# Patient Record
Sex: MALE | Race: WHITE | NOT HISPANIC OR LATINO | URBAN - METROPOLITAN AREA
[De-identification: names, ages, dates, MRNs, and addresses within clinical notes are randomized per-mention and may not be internally consistent; named-entity substitution may affect disease eponyms.]

---

## 2017-05-24 ENCOUNTER — INPATIENT (INPATIENT)
Facility: HOSPITAL | Age: 82
LOS: 4 days | Discharge: HOME | DRG: 312 | End: 2017-05-29
Attending: INTERNAL MEDICINE | Admitting: INTERNAL MEDICINE
Payer: MEDICARE

## 2017-05-24 VITALS
DIASTOLIC BLOOD PRESSURE: 83 MMHG | HEART RATE: 86 BPM | SYSTOLIC BLOOD PRESSURE: 150 MMHG | OXYGEN SATURATION: 100 % | RESPIRATION RATE: 16 BRPM

## 2017-05-24 DIAGNOSIS — R55 SYNCOPE AND COLLAPSE: ICD-10-CM

## 2017-05-24 LAB
ALBUMIN SERPL ELPH-MCNC: 4.5 G/DL — SIGNIFICANT CHANGE UP (ref 3.3–5)
ALP SERPL-CCNC: 61 U/L — SIGNIFICANT CHANGE UP (ref 40–120)
ALT FLD-CCNC: 13 U/L RC — SIGNIFICANT CHANGE UP (ref 10–45)
ANION GAP SERPL CALC-SCNC: 15 MMOL/L — SIGNIFICANT CHANGE UP (ref 5–17)
APTT BLD: 31 SEC — SIGNIFICANT CHANGE UP (ref 27.5–37.4)
AST SERPL-CCNC: 19 U/L — SIGNIFICANT CHANGE UP (ref 10–40)
BASOPHILS # BLD AUTO: 0 K/UL — SIGNIFICANT CHANGE UP (ref 0–0.2)
BASOPHILS NFR BLD AUTO: 0.5 % — SIGNIFICANT CHANGE UP (ref 0–2)
BILIRUB SERPL-MCNC: 0.6 MG/DL — SIGNIFICANT CHANGE UP (ref 0.2–1.2)
BUN SERPL-MCNC: 28 MG/DL — HIGH (ref 7–23)
CALCIUM SERPL-MCNC: 10.5 MG/DL — SIGNIFICANT CHANGE UP (ref 8.4–10.5)
CHLORIDE SERPL-SCNC: 102 MMOL/L — SIGNIFICANT CHANGE UP (ref 96–108)
CK MB BLD-MCNC: 2.4 % — SIGNIFICANT CHANGE UP (ref 0–3.5)
CK MB CFR SERPL CALC: 3.1 NG/ML — SIGNIFICANT CHANGE UP (ref 0–6.7)
CK MB CFR SERPL CALC: 3.1 NG/ML — SIGNIFICANT CHANGE UP (ref 0–6.7)
CK SERPL-CCNC: 128 U/L — SIGNIFICANT CHANGE UP (ref 30–200)
CO2 SERPL-SCNC: 23 MMOL/L — SIGNIFICANT CHANGE UP (ref 22–31)
CREAT SERPL-MCNC: 1.73 MG/DL — HIGH (ref 0.5–1.3)
EOSINOPHIL # BLD AUTO: 0.2 K/UL — SIGNIFICANT CHANGE UP (ref 0–0.5)
EOSINOPHIL NFR BLD AUTO: 1.6 % — SIGNIFICANT CHANGE UP (ref 0–6)
GLUCOSE SERPL-MCNC: 96 MG/DL — SIGNIFICANT CHANGE UP (ref 70–99)
HCT VFR BLD CALC: 39.3 % — SIGNIFICANT CHANGE UP (ref 39–50)
HGB BLD-MCNC: 13.6 G/DL — SIGNIFICANT CHANGE UP (ref 13–17)
INR BLD: 0.96 RATIO — SIGNIFICANT CHANGE UP (ref 0.88–1.16)
LYMPHOCYTES # BLD AUTO: 2.2 K/UL — SIGNIFICANT CHANGE UP (ref 1–3.3)
LYMPHOCYTES # BLD AUTO: 23.4 % — SIGNIFICANT CHANGE UP (ref 13–44)
MCHC RBC-ENTMCNC: 31.1 PG — SIGNIFICANT CHANGE UP (ref 27–34)
MCHC RBC-ENTMCNC: 34.6 GM/DL — SIGNIFICANT CHANGE UP (ref 32–36)
MCV RBC AUTO: 90 FL — SIGNIFICANT CHANGE UP (ref 80–100)
MONOCYTES # BLD AUTO: 1.6 K/UL — HIGH (ref 0–0.9)
MONOCYTES NFR BLD AUTO: 16.5 % — HIGH (ref 2–14)
NEUTROPHILS # BLD AUTO: 5.6 K/UL — SIGNIFICANT CHANGE UP (ref 1.8–7.4)
NEUTROPHILS NFR BLD AUTO: 58 % — SIGNIFICANT CHANGE UP (ref 43–77)
PLATELET # BLD AUTO: 254 K/UL — SIGNIFICANT CHANGE UP (ref 150–400)
POTASSIUM SERPL-MCNC: 4.4 MMOL/L — SIGNIFICANT CHANGE UP (ref 3.5–5.3)
POTASSIUM SERPL-SCNC: 4.4 MMOL/L — SIGNIFICANT CHANGE UP (ref 3.5–5.3)
PROT SERPL-MCNC: 7.4 G/DL — SIGNIFICANT CHANGE UP (ref 6–8.3)
PROTHROM AB SERPL-ACNC: 10.5 SEC — SIGNIFICANT CHANGE UP (ref 9.8–12.7)
RBC # BLD: 4.37 M/UL — SIGNIFICANT CHANGE UP (ref 4.2–5.8)
RBC # FLD: 12.5 % — SIGNIFICANT CHANGE UP (ref 10.3–14.5)
SODIUM SERPL-SCNC: 140 MMOL/L — SIGNIFICANT CHANGE UP (ref 135–145)
TROPONIN T SERPL-MCNC: <0.01 NG/ML — SIGNIFICANT CHANGE UP (ref 0–0.06)
TROPONIN T SERPL-MCNC: <0.01 NG/ML — SIGNIFICANT CHANGE UP (ref 0–0.06)
WBC # BLD: 9.6 K/UL — SIGNIFICANT CHANGE UP (ref 3.8–10.5)
WBC # FLD AUTO: 9.6 K/UL — SIGNIFICANT CHANGE UP (ref 3.8–10.5)

## 2017-05-24 PROCEDURE — 71010: CPT | Mod: 26

## 2017-05-24 PROCEDURE — 93010 ELECTROCARDIOGRAM REPORT: CPT

## 2017-05-24 PROCEDURE — 99285 EMERGENCY DEPT VISIT HI MDM: CPT | Mod: 25

## 2017-05-24 RX ORDER — DIGOXIN 250 MCG
0.12 TABLET ORAL DAILY
Qty: 0 | Refills: 0 | Status: DISCONTINUED | OUTPATIENT
Start: 2017-05-24 | End: 2017-05-24

## 2017-05-24 RX ORDER — METOPROLOL TARTRATE 50 MG
25 TABLET ORAL
Qty: 0 | Refills: 0 | Status: DISCONTINUED | OUTPATIENT
Start: 2017-05-24 | End: 2017-05-29

## 2017-05-24 RX ORDER — DEXTROSE 50 % IN WATER 50 %
25 SYRINGE (ML) INTRAVENOUS ONCE
Qty: 0 | Refills: 0 | Status: DISCONTINUED | OUTPATIENT
Start: 2017-05-24 | End: 2017-05-29

## 2017-05-24 RX ORDER — ASPIRIN/CALCIUM CARB/MAGNESIUM 324 MG
81 TABLET ORAL DAILY
Qty: 0 | Refills: 0 | Status: DISCONTINUED | OUTPATIENT
Start: 2017-05-24 | End: 2017-05-29

## 2017-05-24 RX ORDER — GLUCAGON INJECTION, SOLUTION 0.5 MG/.1ML
1 INJECTION, SOLUTION SUBCUTANEOUS ONCE
Qty: 0 | Refills: 0 | Status: DISCONTINUED | OUTPATIENT
Start: 2017-05-24 | End: 2017-05-29

## 2017-05-24 RX ORDER — AMLODIPINE BESYLATE 2.5 MG/1
5 TABLET ORAL DAILY
Qty: 0 | Refills: 0 | Status: DISCONTINUED | OUTPATIENT
Start: 2017-05-24 | End: 2017-05-28

## 2017-05-24 RX ORDER — DEXTROSE 50 % IN WATER 50 %
1 SYRINGE (ML) INTRAVENOUS ONCE
Qty: 0 | Refills: 0 | Status: DISCONTINUED | OUTPATIENT
Start: 2017-05-24 | End: 2017-05-29

## 2017-05-24 RX ORDER — SODIUM CHLORIDE 9 MG/ML
1000 INJECTION, SOLUTION INTRAVENOUS
Qty: 0 | Refills: 0 | Status: DISCONTINUED | OUTPATIENT
Start: 2017-05-24 | End: 2017-05-29

## 2017-05-24 RX ORDER — INSULIN LISPRO 100/ML
VIAL (ML) SUBCUTANEOUS
Qty: 0 | Refills: 0 | Status: DISCONTINUED | OUTPATIENT
Start: 2017-05-24 | End: 2017-05-29

## 2017-05-24 RX ORDER — DEXTROSE 50 % IN WATER 50 %
12.5 SYRINGE (ML) INTRAVENOUS ONCE
Qty: 0 | Refills: 0 | Status: DISCONTINUED | OUTPATIENT
Start: 2017-05-24 | End: 2017-05-29

## 2017-05-24 RX ORDER — LISINOPRIL 2.5 MG/1
10 TABLET ORAL DAILY
Qty: 0 | Refills: 0 | Status: DISCONTINUED | OUTPATIENT
Start: 2017-05-24 | End: 2017-05-29

## 2017-05-24 RX ORDER — ENOXAPARIN SODIUM 100 MG/ML
40 INJECTION SUBCUTANEOUS DAILY
Qty: 0 | Refills: 0 | Status: DISCONTINUED | OUTPATIENT
Start: 2017-05-24 | End: 2017-05-29

## 2017-05-24 RX ADMIN — LISINOPRIL 10 MILLIGRAM(S): 2.5 TABLET ORAL at 18:26

## 2017-05-24 RX ADMIN — ENOXAPARIN SODIUM 40 MILLIGRAM(S): 100 INJECTION SUBCUTANEOUS at 18:29

## 2017-05-24 RX ADMIN — AMLODIPINE BESYLATE 5 MILLIGRAM(S): 2.5 TABLET ORAL at 18:27

## 2017-05-24 NOTE — ED PROVIDER NOTE - NS ED ROS FT
Constitutional: No fever or chills  Eyes: No visual changes, eye pain or redness  HEENT: No throat pain, ear pain, nasal pain. No nose bleeding.  CV: No chest pain or lower extremity edema  Resp: No SOB no cough  GI: No abd pain. No nausea or vomiting. No diarrhea. No constipation.   : No dysuria, hematuria.   MSK: No musculoskeletal pain  Skin: No rash  Neuro: No headache. No numbness or tingling. No weakness.

## 2017-05-24 NOTE — ED PROVIDER NOTE - PROGRESS NOTE DETAILS
Spoke with Dr. Em and states he agrees patient needs to be admitted to r/o episodes of vtach or other arrythmia possibly causing these repeat episodes. -Bethany Holm PA-C

## 2017-05-24 NOTE — ED PROVIDER NOTE - MEDICAL DECISION MAKING DETAILS
84 y/o M pmhx CAD with stents presenting with 2 episodes of near syncope today; m/p cardiac arrhythmia causing symptoms. PE unremarkable and patient asymptomatic at this time. Will obtain cardiac enzymes, other labs, EKG and reassess m/p TBA Mani: 82 y/o M pmhx CAD with stents presenting with 2 episodes of near syncope today; m/p cardiac arrhythmia causing symptoms. PE unremarkable and patient asymptomatic at this time. Will obtain cardiac enzymes, other labs, EKG and reassess m/p TBA

## 2017-05-24 NOTE — H&P ADULT. - HISTORY OF PRESENT ILLNESS
84 y/o Male PMH CAD s/p stents, lymphoma of spleen s/p splenectomy, presenting after 2 episodes of near syncope today. Pt reports he was in his normal state of health this morning, in the bathroom shaving when he 'started to feel like his vision was going black, and felt very lightheaded.' He reports that he 'grabbed onto the vanity and called for his wife,' and states that he remembers everything that was going on while this occurred. He denies falling and states when his wife came to the bathroom he was back to baseline. He reports he drive approximately 25 miles after that to 'run errands for the day,' and then in the store he started having similar symptoms of lightheadedness and darkening vision, stating his wife and people in the store caught him and lowered him into a chair when symptoms resolved. He denies any chest pain, shortness of breath, headache, nausea, vomiting, dizziness, sweating, or other symptoms occurring before during or after the episodes. Reports he called his PMD Dr. Em who told him to come to the hospital. Reports feels well and back to baseline at this time.    In ER VSS, Labs all normal.     Plan: Admit to telemetry. CPK every 8 hours. 82 y/o Male PMH CAD s/p 4 stents 5 years ago, lymphoma of spleen s/p splenectomy, presenting after 2 episodes of near syncope today. Pt reports he was in his normal state of health this morning, in the bathroom shaving when he 'started to feel like his vision was going black, and felt very lightheaded.' He reports that he 'grabbed onto the vanity and called for his wife,' and states that he remembers everything that was going on while this occurred. He denies falling and states when his wife came to the bathroom he was back to baseline.     He reports he drive approximately 25 miles after that to 'run errands for the day,' and then in the store he started having similar symptoms of lightheadedness and darkening vision, stating his wife and people in the store caught him and lowered him into a chair when symptoms resolved. He denies any chest pain, shortness of breath, headache, nausea, vomiting, dizziness, sweating, or other symptoms occurring before during or after the episodes. Reports he called his PMD Dr. Em who told him to come to the hospital. Reports feels well and back to baseline at this time.    In ER VSS, Labs all normal. Mild MARICRUZ with creatinine 1.73.     Plan: Admit to telemetry. CPK every 8 hours. TTE in AM. Digoxin level in AM. Lipitor 40 mg/day, Hold HCTZ secondary elevated creatinine, Digoxin .125mg/day and Lisinopril 40 mg/day. Hold Januvia and Amaryl while in hospital. Finger sticks AC and HS.       Plan: Admit to telemetry. CPK every 8 hours. 82 y/o Male PMH CAD s/p 4 stents 5 years ago, lymphoma of spleen s/p splenectomy, presenting after 2 episodes of near syncope today. Pt reports he was in his normal state of health this morning, in the bathroom shaving when he 'started to feel like his vision was going black, and felt very lightheaded.' He reports that he 'grabbed onto the vanity and called for his wife,' and states that he remembers everything that was going on while this occurred. He denies falling and states when his wife came to the bathroom he was back to baseline.     He reports he drive approximately 25 miles after that to 'run errands for the day,' and then in the store he started having similar symptoms of lightheadedness and darkening vision, stating his wife and people in the store caught him and lowered him into a chair when symptoms resolved. He denies any chest pain, shortness of breath, headache, nausea, vomiting, dizziness, sweating, or other symptoms occurring before during or after the episodes. Reports he called his PMD Dr. Em who told him to come to the hospital. Reports feels well and back to baseline at this time.    In ER VSS, Labs all normal. Mild MARICRUZ with creatinine 1.73.     Plan: Admit to telemetry. r/o arrhythmia. CPK every 8 hours. TTE in AM. Digoxin level in AM. Lipitor 40 mg/day, Hold HCTZ secondary elevated creatinine, Digoxin .125mg/day and Lisinopril 40 mg/day. Hold Januvia and Amaryl while in hospital. Finger sticks AC and HS.

## 2017-05-24 NOTE — ED ADULT NURSE NOTE - OBJECTIVE STATEMENT
83y male BIBEMS with hx of stents presents to the ER after near syncope episode. Pt is alert and oriented x 3 and speaking coherently. PT states the first episode started this am around 0900 while he was shaving- vsison went black- but had no LOC. Pt wife came and sat him down. Second epsidoe was while at the store. Pt stated he became dizzy and had blurring vision, people in store caught him before he fell. denies loc for this episode as well. PT has hx lymphoma on spleen. had brief period of afib after spleen removal. pt denies cp, sob 83y male BIBEMS with hx of stents presents to the ER after near syncope episode. Pt is alert and oriented x 3 and speaking coherently. PT states the first episode started this am around 0900 while he was shaving- vision went black- but had no LOC. Pt wife came and sat him down. Second episode was while at the store. Pt stated he became dizzy and had blurring vision, people in store caught him before he fell. denies loc for this episode as well. PT has hx lymphoma on spleen. had brief period of afib after spleen removal. Pt currently in remission. pt denies cp, sob, n/v/d, fevers or chills. Pt wife at bed side, PA at bedside, Will continue to reassess.

## 2017-05-24 NOTE — ED PROVIDER NOTE - OBJECTIVE STATEMENT
84 y/o M pmhx CAD s/p stents, lymphoma of spleen s/p splenectomy, presenting after 2 episodes of near syncope today. Pt reports he was in his normal state of health this morning, in the bathroom shaving when he 'started to feel like his vision was going black, and felt very lightheaded.' He reports that he 'grabbed onto the vanity and called for his wife,' and states that he remembers everything that was going on while this occurred. He denies falling and states when his wife came to the bathroom he was back to baseline. He reports he drive approximately 25 miles after that to 'run errands for the day,' and then in the store he started having similar symptoms of lightheadedness and darkening vision, stating his wife and people in the store caught him and lowered him into a chair when symptoms resolved. He denies any chest pain, shortness of breath, headache, nausea, vomiting, dizziness, sweating, or other symptoms occurring before during or after the episodes. Reports he called his PMD Dr. Em who told him to come to the hospital. Reports feels well and back to baseline at this time.

## 2017-05-25 LAB
ANION GAP SERPL CALC-SCNC: 19 MMOL/L — HIGH (ref 5–17)
ANION GAP SERPL CALC-SCNC: 24 MMOL/L — HIGH (ref 5–17)
BUN SERPL-MCNC: 28 MG/DL — HIGH (ref 7–23)
BUN SERPL-MCNC: 31 MG/DL — HIGH (ref 7–23)
CALCIUM SERPL-MCNC: 9.9 MG/DL — SIGNIFICANT CHANGE UP (ref 8.4–10.5)
CALCIUM SERPL-MCNC: 9.9 MG/DL — SIGNIFICANT CHANGE UP (ref 8.4–10.5)
CHLORIDE SERPL-SCNC: 103 MMOL/L — SIGNIFICANT CHANGE UP (ref 96–108)
CHLORIDE SERPL-SCNC: 104 MMOL/L — SIGNIFICANT CHANGE UP (ref 96–108)
CHOLEST SERPL-MCNC: 184 MG/DL — SIGNIFICANT CHANGE UP (ref 10–199)
CO2 SERPL-SCNC: 14 MMOL/L — LOW (ref 22–31)
CO2 SERPL-SCNC: 15 MMOL/L — LOW (ref 22–31)
CREAT SERPL-MCNC: 1.67 MG/DL — HIGH (ref 0.5–1.3)
CREAT SERPL-MCNC: 1.77 MG/DL — HIGH (ref 0.5–1.3)
DIGOXIN SERPL-MCNC: 1 NG/ML — SIGNIFICANT CHANGE UP (ref 0.8–2)
GLUCOSE SERPL-MCNC: 92 MG/DL — SIGNIFICANT CHANGE UP (ref 70–99)
GLUCOSE SERPL-MCNC: 93 MG/DL — SIGNIFICANT CHANGE UP (ref 70–99)
HBA1C BLD-MCNC: 6.8 % — HIGH (ref 4–5.6)
HDLC SERPL-MCNC: 36 MG/DL — LOW (ref 40–125)
LIPID PNL WITH DIRECT LDL SERPL: SIGNIFICANT CHANGE UP
POTASSIUM SERPL-MCNC: 5 MMOL/L — SIGNIFICANT CHANGE UP (ref 3.5–5.3)
POTASSIUM SERPL-MCNC: 5 MMOL/L — SIGNIFICANT CHANGE UP (ref 3.5–5.3)
POTASSIUM SERPL-SCNC: 5 MMOL/L — SIGNIFICANT CHANGE UP (ref 3.5–5.3)
POTASSIUM SERPL-SCNC: 5 MMOL/L — SIGNIFICANT CHANGE UP (ref 3.5–5.3)
SODIUM SERPL-SCNC: 137 MMOL/L — SIGNIFICANT CHANGE UP (ref 135–145)
SODIUM SERPL-SCNC: 142 MMOL/L — SIGNIFICANT CHANGE UP (ref 135–145)
TOTAL CHOLESTEROL/HDL RATIO MEASUREMENT: 5.1 RATIO — SIGNIFICANT CHANGE UP (ref 3.4–9.6)
TRIGL SERPL-MCNC: 410 MG/DL — HIGH (ref 10–149)
TROPONIN T SERPL-MCNC: <0.01 NG/ML — SIGNIFICANT CHANGE UP (ref 0–0.06)
TSH SERPL-MCNC: 4.07 UIU/ML — SIGNIFICANT CHANGE UP (ref 0.27–4.2)

## 2017-05-25 PROCEDURE — 93306 TTE W/DOPPLER COMPLETE: CPT | Mod: 26

## 2017-05-25 RX ADMIN — ENOXAPARIN SODIUM 40 MILLIGRAM(S): 100 INJECTION SUBCUTANEOUS at 12:16

## 2017-05-25 RX ADMIN — Medication 81 MILLIGRAM(S): at 12:16

## 2017-05-25 RX ADMIN — AMLODIPINE BESYLATE 5 MILLIGRAM(S): 2.5 TABLET ORAL at 05:30

## 2017-05-25 RX ADMIN — LISINOPRIL 10 MILLIGRAM(S): 2.5 TABLET ORAL at 05:30

## 2017-05-25 RX ADMIN — Medication 25 MILLIGRAM(S): at 05:30

## 2017-05-25 RX ADMIN — Medication 25 MILLIGRAM(S): at 17:38

## 2017-05-25 RX ADMIN — Medication 1: at 17:39

## 2017-05-26 LAB
HCT VFR BLD CALC: 38.9 % — LOW (ref 39–50)
HGB BLD-MCNC: 13 G/DL — SIGNIFICANT CHANGE UP (ref 13–17)
MCHC RBC-ENTMCNC: 30.6 PG — SIGNIFICANT CHANGE UP (ref 27–34)
MCHC RBC-ENTMCNC: 33.5 GM/DL — SIGNIFICANT CHANGE UP (ref 32–36)
MCV RBC AUTO: 91.3 FL — SIGNIFICANT CHANGE UP (ref 80–100)
PLATELET # BLD AUTO: 261 K/UL — SIGNIFICANT CHANGE UP (ref 150–400)
RBC # BLD: 4.26 M/UL — SIGNIFICANT CHANGE UP (ref 4.2–5.8)
RBC # FLD: 12.4 % — SIGNIFICANT CHANGE UP (ref 10.3–14.5)
WBC # BLD: 8.4 K/UL — SIGNIFICANT CHANGE UP (ref 3.8–10.5)
WBC # FLD AUTO: 8.4 K/UL — SIGNIFICANT CHANGE UP (ref 3.8–10.5)

## 2017-05-26 RX ADMIN — Medication 81 MILLIGRAM(S): at 11:57

## 2017-05-26 RX ADMIN — Medication 25 MILLIGRAM(S): at 18:45

## 2017-05-26 RX ADMIN — ENOXAPARIN SODIUM 40 MILLIGRAM(S): 100 INJECTION SUBCUTANEOUS at 11:57

## 2017-05-26 RX ADMIN — LISINOPRIL 10 MILLIGRAM(S): 2.5 TABLET ORAL at 06:19

## 2017-05-26 RX ADMIN — AMLODIPINE BESYLATE 5 MILLIGRAM(S): 2.5 TABLET ORAL at 06:19

## 2017-05-26 RX ADMIN — Medication 25 MILLIGRAM(S): at 06:19

## 2017-05-26 NOTE — PROVIDER CONTACT NOTE (OTHER) - BACKGROUND
admitted with syncope /collapse ,DM patient alertx3
patient admitted with syncope / collapse héctor negative waiting for stress test on sunday
admitted with syncope

## 2017-05-27 LAB
ANION GAP SERPL CALC-SCNC: 23 MMOL/L — HIGH (ref 5–17)
BUN SERPL-MCNC: 42 MG/DL — HIGH (ref 7–23)
CALCIUM SERPL-MCNC: 10 MG/DL — SIGNIFICANT CHANGE UP (ref 8.4–10.5)
CHLORIDE SERPL-SCNC: 104 MMOL/L — SIGNIFICANT CHANGE UP (ref 96–108)
CO2 SERPL-SCNC: 15 MMOL/L — LOW (ref 22–31)
CREAT SERPL-MCNC: 2.02 MG/DL — HIGH (ref 0.5–1.3)
GLUCOSE SERPL-MCNC: 125 MG/DL — HIGH (ref 70–99)
MAGNESIUM SERPL-MCNC: 1.9 MG/DL — SIGNIFICANT CHANGE UP (ref 1.6–2.6)
PHOSPHATE SERPL-MCNC: 4.4 MG/DL — SIGNIFICANT CHANGE UP (ref 2.5–4.5)
POTASSIUM SERPL-MCNC: 4.7 MMOL/L — SIGNIFICANT CHANGE UP (ref 3.5–5.3)
POTASSIUM SERPL-SCNC: 4.7 MMOL/L — SIGNIFICANT CHANGE UP (ref 3.5–5.3)
SODIUM SERPL-SCNC: 142 MMOL/L — SIGNIFICANT CHANGE UP (ref 135–145)

## 2017-05-27 RX ORDER — SODIUM CHLORIDE 9 MG/ML
1000 INJECTION, SOLUTION INTRAVENOUS
Qty: 0 | Refills: 0 | Status: DISCONTINUED | OUTPATIENT
Start: 2017-05-27 | End: 2017-05-29

## 2017-05-27 RX ADMIN — Medication 25 MILLIGRAM(S): at 18:02

## 2017-05-27 RX ADMIN — Medication 81 MILLIGRAM(S): at 12:21

## 2017-05-27 RX ADMIN — LISINOPRIL 10 MILLIGRAM(S): 2.5 TABLET ORAL at 06:35

## 2017-05-27 RX ADMIN — Medication 25 MILLIGRAM(S): at 06:35

## 2017-05-27 RX ADMIN — AMLODIPINE BESYLATE 5 MILLIGRAM(S): 2.5 TABLET ORAL at 06:35

## 2017-05-27 RX ADMIN — ENOXAPARIN SODIUM 40 MILLIGRAM(S): 100 INJECTION SUBCUTANEOUS at 12:21

## 2017-05-27 NOTE — PHYSICAL THERAPY INITIAL EVALUATION ADULT - PERTINENT HX OF CURRENT PROBLEM, REHAB EVAL
83yoM p/w syncope x2, no LOC, no fall, 5/26/17 CXR, b/l pleural effusion, Left Lower lobe PNA, b/l LE (-) DVT, 5/23/17 ECG, st/t wave abnormality, prolong QT

## 2017-05-28 LAB
ANION GAP SERPL CALC-SCNC: 16 MMOL/L — SIGNIFICANT CHANGE UP (ref 5–17)
BUN SERPL-MCNC: 42 MG/DL — HIGH (ref 7–23)
CALCIUM SERPL-MCNC: 9.4 MG/DL — SIGNIFICANT CHANGE UP (ref 8.4–10.5)
CHLORIDE SERPL-SCNC: 103 MMOL/L — SIGNIFICANT CHANGE UP (ref 96–108)
CO2 SERPL-SCNC: 19 MMOL/L — LOW (ref 22–31)
CREAT SERPL-MCNC: 1.84 MG/DL — HIGH (ref 0.5–1.3)
GLUCOSE SERPL-MCNC: 121 MG/DL — HIGH (ref 70–99)
POTASSIUM SERPL-MCNC: 4.6 MMOL/L — SIGNIFICANT CHANGE UP (ref 3.5–5.3)
POTASSIUM SERPL-SCNC: 4.6 MMOL/L — SIGNIFICANT CHANGE UP (ref 3.5–5.3)
SODIUM SERPL-SCNC: 138 MMOL/L — SIGNIFICANT CHANGE UP (ref 135–145)

## 2017-05-28 PROCEDURE — 93018 CV STRESS TEST I&R ONLY: CPT

## 2017-05-28 PROCEDURE — 93016 CV STRESS TEST SUPVJ ONLY: CPT

## 2017-05-28 PROCEDURE — 78452 HT MUSCLE IMAGE SPECT MULT: CPT | Mod: 26

## 2017-05-28 RX ADMIN — Medication 81 MILLIGRAM(S): at 12:04

## 2017-05-28 RX ADMIN — Medication 25 MILLIGRAM(S): at 19:13

## 2017-05-28 RX ADMIN — SODIUM CHLORIDE 75 MILLILITER(S): 9 INJECTION, SOLUTION INTRAVENOUS at 06:17

## 2017-05-28 RX ADMIN — LISINOPRIL 10 MILLIGRAM(S): 2.5 TABLET ORAL at 06:15

## 2017-05-28 RX ADMIN — ENOXAPARIN SODIUM 40 MILLIGRAM(S): 100 INJECTION SUBCUTANEOUS at 12:04

## 2017-05-28 RX ADMIN — Medication 25 MILLIGRAM(S): at 05:14

## 2017-05-28 RX ADMIN — SODIUM CHLORIDE 100 MILLILITER(S): 9 INJECTION, SOLUTION INTRAVENOUS at 12:08

## 2017-05-28 RX ADMIN — SODIUM CHLORIDE 100 MILLILITER(S): 9 INJECTION, SOLUTION INTRAVENOUS at 20:11

## 2017-05-28 NOTE — PROVIDER CONTACT NOTE (MEDICATION) - ASSESSMENT
pt bp 100/59 HR 54, due for multiple cardiac meds, asymptomatic. due for metoprolol, norvasc and lisinopril

## 2017-05-29 VITALS
SYSTOLIC BLOOD PRESSURE: 119 MMHG | OXYGEN SATURATION: 97 % | RESPIRATION RATE: 18 BRPM | TEMPERATURE: 98 F | HEART RATE: 50 BPM | DIASTOLIC BLOOD PRESSURE: 61 MMHG

## 2017-05-29 LAB
ANION GAP SERPL CALC-SCNC: 17 MMOL/L — SIGNIFICANT CHANGE UP (ref 5–17)
BUN SERPL-MCNC: 40 MG/DL — HIGH (ref 7–23)
CALCIUM SERPL-MCNC: 9.6 MG/DL — SIGNIFICANT CHANGE UP (ref 8.4–10.5)
CHLORIDE SERPL-SCNC: 105 MMOL/L — SIGNIFICANT CHANGE UP (ref 96–108)
CO2 SERPL-SCNC: 17 MMOL/L — LOW (ref 22–31)
CREAT SERPL-MCNC: 1.77 MG/DL — HIGH (ref 0.5–1.3)
GLUCOSE SERPL-MCNC: 114 MG/DL — HIGH (ref 70–99)
POTASSIUM SERPL-MCNC: 5.1 MMOL/L — SIGNIFICANT CHANGE UP (ref 3.5–5.3)
POTASSIUM SERPL-SCNC: 5.1 MMOL/L — SIGNIFICANT CHANGE UP (ref 3.5–5.3)
SODIUM SERPL-SCNC: 139 MMOL/L — SIGNIFICANT CHANGE UP (ref 135–145)

## 2017-05-29 PROCEDURE — 93306 TTE W/DOPPLER COMPLETE: CPT

## 2017-05-29 PROCEDURE — 84100 ASSAY OF PHOSPHORUS: CPT

## 2017-05-29 PROCEDURE — 93017 CV STRESS TEST TRACING ONLY: CPT

## 2017-05-29 PROCEDURE — 71045 X-RAY EXAM CHEST 1 VIEW: CPT

## 2017-05-29 PROCEDURE — 80048 BASIC METABOLIC PNL TOTAL CA: CPT

## 2017-05-29 PROCEDURE — 80162 ASSAY OF DIGOXIN TOTAL: CPT

## 2017-05-29 PROCEDURE — 84484 ASSAY OF TROPONIN QUANT: CPT

## 2017-05-29 PROCEDURE — 84443 ASSAY THYROID STIM HORMONE: CPT

## 2017-05-29 PROCEDURE — 80053 COMPREHEN METABOLIC PANEL: CPT

## 2017-05-29 PROCEDURE — 85730 THROMBOPLASTIN TIME PARTIAL: CPT

## 2017-05-29 PROCEDURE — 85027 COMPLETE CBC AUTOMATED: CPT

## 2017-05-29 PROCEDURE — 78452 HT MUSCLE IMAGE SPECT MULT: CPT

## 2017-05-29 PROCEDURE — 83036 HEMOGLOBIN GLYCOSYLATED A1C: CPT

## 2017-05-29 PROCEDURE — 97161 PT EVAL LOW COMPLEX 20 MIN: CPT

## 2017-05-29 PROCEDURE — 83735 ASSAY OF MAGNESIUM: CPT

## 2017-05-29 PROCEDURE — 85610 PROTHROMBIN TIME: CPT

## 2017-05-29 PROCEDURE — 76775 US EXAM ABDO BACK WALL LIM: CPT

## 2017-05-29 PROCEDURE — 82962 GLUCOSE BLOOD TEST: CPT

## 2017-05-29 PROCEDURE — 99285 EMERGENCY DEPT VISIT HI MDM: CPT | Mod: 25

## 2017-05-29 PROCEDURE — 82550 ASSAY OF CK (CPK): CPT

## 2017-05-29 PROCEDURE — A9500: CPT

## 2017-05-29 PROCEDURE — A9505: CPT

## 2017-05-29 PROCEDURE — 82553 CREATINE MB FRACTION: CPT

## 2017-05-29 PROCEDURE — 76775 US EXAM ABDO BACK WALL LIM: CPT | Mod: 26

## 2017-05-29 PROCEDURE — 80061 LIPID PANEL: CPT

## 2017-05-29 PROCEDURE — 93005 ELECTROCARDIOGRAM TRACING: CPT

## 2017-05-29 RX ORDER — METOPROLOL TARTRATE 50 MG
1 TABLET ORAL
Qty: 60 | Refills: 0 | OUTPATIENT
Start: 2017-05-29 | End: 2017-06-28

## 2017-05-29 RX ORDER — LISINOPRIL 2.5 MG/1
1 TABLET ORAL
Qty: 30 | Refills: 0 | OUTPATIENT
Start: 2017-05-29 | End: 2017-06-28

## 2017-05-29 RX ORDER — AMLODIPINE BESYLATE 2.5 MG/1
1 TABLET ORAL
Qty: 0 | Refills: 0 | COMMUNITY

## 2017-05-29 RX ORDER — ACETYLCYSTEINE 200 MG/ML
1200 VIAL (ML) MISCELLANEOUS
Qty: 0 | Refills: 0 | Status: DISCONTINUED | OUTPATIENT
Start: 2017-05-29 | End: 2017-05-29

## 2017-05-29 RX ORDER — ASPIRIN/CALCIUM CARB/MAGNESIUM 324 MG
1 TABLET ORAL
Qty: 0 | Refills: 0 | COMMUNITY
Start: 2017-05-29

## 2017-05-29 RX ORDER — LISINOPRIL 2.5 MG/1
0.5 TABLET ORAL
Qty: 0 | Refills: 0 | COMMUNITY

## 2017-05-29 RX ORDER — DIGOXIN 250 MCG
1 TABLET ORAL
Qty: 0 | Refills: 0 | COMMUNITY

## 2017-05-29 RX ORDER — ATORVASTATIN CALCIUM 80 MG/1
40 TABLET, FILM COATED ORAL AT BEDTIME
Qty: 0 | Refills: 0 | Status: DISCONTINUED | OUTPATIENT
Start: 2017-05-29 | End: 2017-05-29

## 2017-05-29 RX ADMIN — LISINOPRIL 10 MILLIGRAM(S): 2.5 TABLET ORAL at 05:08

## 2017-05-29 RX ADMIN — Medication 25 MILLIGRAM(S): at 05:09

## 2017-05-29 RX ADMIN — Medication 81 MILLIGRAM(S): at 12:00

## 2017-05-29 RX ADMIN — ENOXAPARIN SODIUM 40 MILLIGRAM(S): 100 INJECTION SUBCUTANEOUS at 12:00

## 2017-05-29 NOTE — DISCHARGE NOTE ADULT - CARE PROVIDER_API CALL
Marlon Hartley), Internal Medicine  [address] [place] [address]  Phone: [phone]  Fax: [phone]

## 2017-05-29 NOTE — DISCHARGE NOTE ADULT - CARE PLAN
Principal Discharge DX:	Near syncope  Goal:	You will need cardiac cath  Instructions for follow-up, activity and diet:	HOME CARE INSTRUCTIONS  Have someone stay with you until you feel stable.  Do not drive, operate machinery, or play sports until your caregiver says it is okay.  Keep all follow-up appointments as directed by your caregiver.   Lie down right away if you start feeling like you might faint. Breathe deeply and steadily. Wait until all the symptoms have passed.Drink enough fluids to keep your urine clear or pale yellow.  If you are taking blood pressure or heart medicine, get up slowly, taking several minutes to sit and then stand. This can reduce dizziness.  SEEK IMMEDIATE MEDICAL CARE IF:  You have a severe headache.  You have unusual pain in the chest, abdomen, or back.  You are bleeding from the mouth or rectum, or you have black or tarry stool.  You have an irregular or very fast heartbeat.  You have pain with breathing.  You have repeated fainting or seizure-like jerking during an episode.  You faint when sitting or lying down.  You have confusion.  You have difficulty walking.  You have severe weakness.  You have vision problems.  If you fainted, call your local emergency services (911__). Do not drive yourself to the hospital  Secondary Diagnosis:	MARICRUZ (acute kidney injury)  Instructions for follow-up, activity and diet:	Avoid taking (NSAIDs) - (ex: Ibuprofen, Advil, Celebrex, Naprosyn)  Avoid taking any nephrotoxic agents (can harm kidneys) - Intravenous contrast for diagnostic testing, combination cold medications.  Have all medications adjusted for your renal function by your Health Care Provider.  Blood pressure control is important.  Take all medication as prescribed.  Secondary Diagnosis:	CAD (coronary artery disease)  Instructions for follow-up, activity and diet:	Coronary artery disease is a condition where the arteries the supply the heart muscle get clogges with fatty deposits & puts you at risk for a heart attack  Call your doctor if you have any new pain, pressure, or discomfort in the center of your chest, pain, tingling or discomfort in arms, back, neck, jaw, or stomach, shortness of breath, nausea, vomiting, burping or heartburn, sweating, cold and clammy skin, racing or abnormal heartbeat for more than 10 minutes or if they keep coming & going.  Call 911 and do not tr to get to hospital by care  You can help yourself with lefestyle changes (quitting smoking if you smoke), eat lots of fruits & vegetables & low fat dairy products, not a lot of meat & fatty foods, walk or some form of physical activity most days of the week, lose weight if you are overweight  Take your cardiac medication as prescribed to lower cholesterol, to lower blood pressure, aspirin to prevent blood clots, and diabetes control  Make sure to keep appointments with doctor for cardiac follow up care

## 2017-05-29 NOTE — DISCHARGE NOTE ADULT - HOSPITAL COURSE
pmd 83 year old male admitted with syncope x 2. PMH of DM and 4 prior cardiac stents. CPK all normal. TTE showed EF 44 % which was new decrease in function. Stress test showed apical scar, no ischemia. EKG showed PVC. SMA-7 showed CKD III. Renal sonogram was normal.     Patient prefered to be discharged and will have possible cardiac cath at [facility] the next day after discharge. See med list.

## 2017-05-29 NOTE — DISCHARGE NOTE ADULT - MEDICATION SUMMARY - MEDICATIONS TO CHANGE
I will SWITCH the dose or number of times a day I take the medications listed below when I get home from the hospital:    lisinopril 20 mg oral tablet  -- 0.5 tab(s) by mouth once a day in the evening

## 2017-05-29 NOTE — DISCHARGE NOTE ADULT - PLAN OF CARE
You will need cardiac cath HOME CARE INSTRUCTIONS  Have someone stay with you until you feel stable.  Do not drive, operate machinery, or play sports until your caregiver says it is okay.  Keep all follow-up appointments as directed by your caregiver.   Lie down right away if you start feeling like you might faint. Breathe deeply and steadily. Wait until all the symptoms have passed.Drink enough fluids to keep your urine clear or pale yellow.  If you are taking blood pressure or heart medicine, get up slowly, taking several minutes to sit and then stand. This can reduce dizziness.  SEEK IMMEDIATE MEDICAL CARE IF:  You have a severe headache.  You have unusual pain in the chest, abdomen, or back.  You are bleeding from the mouth or rectum, or you have black or tarry stool.  You have an irregular or very fast heartbeat.  You have pain with breathing.  You have repeated fainting or seizure-like jerking during an episode.  You faint when sitting or lying down.  You have confusion.  You have difficulty walking.  You have severe weakness.  You have vision problems.  If you fainted, call your local emergency services (911__). Do not drive yourself to the hospital Avoid taking (NSAIDs) - (ex: Ibuprofen, Advil, Celebrex, Naprosyn)  Avoid taking any nephrotoxic agents (can harm kidneys) - Intravenous contrast for diagnostic testing, combination cold medications.  Have all medications adjusted for your renal function by your Health Care Provider.  Blood pressure control is important.  Take all medication as prescribed. Coronary artery disease is a condition where the arteries the supply the heart muscle get clogges with fatty deposits & puts you at risk for a heart attack  Call your doctor if you have any new pain, pressure, or discomfort in the center of your chest, pain, tingling or discomfort in arms, back, neck, jaw, or stomach, shortness of breath, nausea, vomiting, burping or heartburn, sweating, cold and clammy skin, racing or abnormal heartbeat for more than 10 minutes or if they keep coming & going.  Call 911 and do not tr to get to hospital by care  You can help yourself with lefestyle changes (quitting smoking if you smoke), eat lots of fruits & vegetables & low fat dairy products, not a lot of meat & fatty foods, walk or some form of physical activity most days of the week, lose weight if you are overweight  Take your cardiac medication as prescribed to lower cholesterol, to lower blood pressure, aspirin to prevent blood clots, and diabetes control  Make sure to keep appointments with doctor for cardiac follow up care

## 2017-05-29 NOTE — DISCHARGE NOTE ADULT - MEDICATION SUMMARY - MEDICATIONS TO STOP TAKING
I will STOP taking the medications listed below when I get home from the hospital:    hydroCHLOROthiazide 25 mg oral tablet  -- 1 tab(s) by mouth once a day    digoxin 125 mcg (0.125 mg) oral tablet  -- 1 tab(s) by mouth once a day    amLODIPine 5 mg oral tablet  -- 1 tab(s) by mouth once a day

## 2017-05-29 NOTE — DISCHARGE NOTE ADULT - PATIENT PORTAL LINK FT
“You can access the FollowHealth Patient Portal, offered by [facility], by registering with the following website: [email or web address]”

## 2017-05-29 NOTE — DISCHARGE NOTE ADULT - MEDICATION SUMMARY - MEDICATIONS TO TAKE
I will START or STAY ON the medications listed below when I get home from the hospital:    aspirin 81 mg oral delayed release tablet  -- 1 tab(s) by mouth once a day  -- Indication: For cad    lisinopril 10 mg oral tablet  -- 1 tab(s) by mouth once a day  -- Indication: For essential HTN    Januvia 50 mg oral tablet  -- 1 tab(s) by mouth once a day  -- Indication: For dm    Amaryl 2 mg oral tablet  -- 1 tab(s) by mouth once a day  -- Indication: For dm    atorvastatin 40 mg oral tablet  -- 1 tab(s) by mouth once a day  -- Indication: For hld    metoprolol tartrate 25 mg oral tablet  -- 1 tab(s) by mouth 2 times a day  -- Indication: For htn

## 2017-06-19 ENCOUNTER — OUTPATIENT (OUTPATIENT)
Dept: OUTPATIENT SERVICES | Facility: HOSPITAL | Age: 82
LOS: 1 days | Discharge: HOME | End: 2017-06-19
Payer: MEDICARE

## 2017-06-19 VITALS
TEMPERATURE: 99 F | OXYGEN SATURATION: 98 % | DIASTOLIC BLOOD PRESSURE: 62 MMHG | RESPIRATION RATE: 14 BRPM | SYSTOLIC BLOOD PRESSURE: 137 MMHG | HEART RATE: 59 BPM

## 2017-06-19 DIAGNOSIS — N20.0 CALCULUS OF KIDNEY: Chronic | ICD-10-CM

## 2017-06-19 DIAGNOSIS — Z90.81 ACQUIRED ABSENCE OF SPLEEN: Chronic | ICD-10-CM

## 2017-06-19 DIAGNOSIS — I42.5 OTHER RESTRICTIVE CARDIOMYOPATHY: ICD-10-CM

## 2017-06-19 PROBLEM — Z00.00 ENCOUNTER FOR PREVENTIVE HEALTH EXAMINATION: Status: ACTIVE | Noted: 2017-06-19

## 2017-06-19 PROCEDURE — C1764: CPT

## 2017-06-19 PROCEDURE — 93620 COMP EP EVL R AT VEN PAC&REC: CPT

## 2017-06-19 PROCEDURE — C1730: CPT

## 2017-06-19 PROCEDURE — 93620 COMP EP EVL R AT VEN PAC&REC: CPT | Mod: 26

## 2017-06-19 PROCEDURE — 33282: CPT

## 2017-06-19 PROCEDURE — C1894: CPT

## 2017-06-19 RX ORDER — GLIMEPIRIDE 1 MG
1 TABLET ORAL
Qty: 0 | Refills: 0 | COMMUNITY

## 2017-06-19 RX ORDER — ATORVASTATIN CALCIUM 80 MG/1
1 TABLET, FILM COATED ORAL
Qty: 0 | Refills: 0 | COMMUNITY

## 2017-06-19 RX ORDER — SITAGLIPTIN 50 MG/1
1 TABLET, FILM COATED ORAL
Qty: 0 | Refills: 0 | COMMUNITY

## 2017-06-19 NOTE — PROGRESS NOTE ADULT - SUBJECTIVE AND OBJECTIVE BOX
Brief procedure note, full note to follow.    Uncomplicated comprehensive electrophysiologic study via right femoral vein.  Baseline rhythm is sinus with unifocal premature ventricular complexes in bigeminy and trigeminy.  Baseline A-H normal at 120 msec.  Baseline H-V normal at 50 msec.  Anterograde AV kt Wenckebach at rates faster than 150 ppm.  Normal response (slight sinus slowing) to right and then left carotid massage.    No inducible ventricular arrhythmias. Stimulation performed with up to triple extrastimulit to refractoriness.  Normal electrophysiologic study.  Sheaths removed with manual hemostasis. No complications.    The right chest was then prepped and draped in the usual sterile fashion and a [employer] Linq Implantable Loop Recorder placed.    Plan is for discharge home tonight, follow-up with Dr. Ya.

## 2017-06-19 NOTE — H&P ADULT - HISTORY OF PRESENT ILLNESS
82 y/o M with h/o DM, HTN, lymphoma s/p splenectomy in Sept 2016 followed by chemotherapy finished in Oct 2016, postop AFIB that lasted for 2 days post splenectomy while in the hospital, and newly diagnosed non-ischemic cardiomyopathy.  He underwent cardiac cath in may 2017 that showed non-obstructive CAD. The reason why he had cardiac w/u recently is because about 5 weeks ago he experienced few episodes of near-syncope and dizziness.  First episode happened when he was in the car in the 's seat. No LOC.  The episode lasted for a few minutes and after that he drove home without issue. Appropriately 3 hours later he had another similar episode while at home.  He 84 y/o M with h/o childhood scarlet fever, MVP, DM, HTN, lymphoma s/p splenectomy in Sept 2016 followed by chemotherapy finished in Oct 2016, postop AFIB that lasted for 2 days post splenectomy while in the hospital, and newly diagnosed non-ischemic cardiomyopathy.  He underwent cardiac cath in may 2017 that showed non-obstructive CAD. The reason why he had cardiac w/u recently is because about 5 weeks ago he experienced few episodes of near-syncope and dizziness.  First episode happened when he was in the car in the 's seat. No LOC.  The episode lasted for a few minutes and after that he drove home without issue. Appropriately 3 hours later he had another similar episode while at home.  He wore a 2week Event monitor, which he just removed on 6/15 and a few hours after he removed it he had yet another similar episodes. Event monitor was unremarkable for the time he was wearing it.  He denies CP, palpitations, SOB, LE edema or decreased in overall energy level despite reduced LVEF.  At baseline he climbs stairs at home multiple times per day.  He is here for EPS to risk stratify and to see any inducible arrhythmia, and possible LINQ implant if EPS is unremarkable.

## 2017-06-19 NOTE — H&P ADULT - ASSESSMENT
84 y/o M with h/o DM, HTN, lymphoma s/p splenectomy in Sept 2016 followed by chemotherapy finished in Oct 2016, postop AFIB that lasted for 2 days post splenectomy while in the hospital, and newly diagnosed non-ischemic cardiomyopathy.  Pt with near-syncopal episodes recently and unremarkable finding on 2week event monitor.  He is here for EPS to risk stratify and to see any inducible arrhythmia, and possible LINQ implant if EPS is unremarkable.    - Cannot rule out chemotherapy induced cardiomyopathy.  He is on medical therapy currently for CMP (coreg started 2 weeks ago and Lisinopril). No ICD at this time. Will need 3 months of medical therapy and to re-evaluate LVEF thereafter.  - EPS today. Possible LINQ.

## 2017-06-23 RX ORDER — CHOLECALCIFEROL (VITAMIN D3) 125 MCG
1 CAPSULE ORAL
Qty: 0 | Refills: 0 | COMMUNITY

## 2017-06-23 RX ORDER — SITAGLIPTIN 50 MG/1
1 TABLET, FILM COATED ORAL
Qty: 0 | Refills: 0 | COMMUNITY

## 2017-06-23 RX ORDER — GLIMEPIRIDE 1 MG
1 TABLET ORAL
Qty: 0 | Refills: 0 | COMMUNITY

## 2017-06-23 RX ORDER — PREGABALIN 225 MG/1
0 CAPSULE ORAL
Qty: 0 | Refills: 0 | COMMUNITY

## 2017-06-23 RX ORDER — CARVEDILOL PHOSPHATE 80 MG/1
1 CAPSULE, EXTENDED RELEASE ORAL
Qty: 0 | Refills: 0 | COMMUNITY

## 2017-07-20 ENCOUNTER — APPOINTMENT (OUTPATIENT)
Dept: HEART AND VASCULAR | Facility: CLINIC | Age: 82
End: 2017-07-20
Payer: MEDICARE

## 2017-07-20 VITALS
HEIGHT: 65 IN | BODY MASS INDEX: 24.32 KG/M2 | DIASTOLIC BLOOD PRESSURE: 66 MMHG | SYSTOLIC BLOOD PRESSURE: 150 MMHG | HEART RATE: 78 BPM | WEIGHT: 146 LBS

## 2017-07-20 DIAGNOSIS — E11.9 TYPE 2 DIABETES MELLITUS W/OUT COMPLICATIONS: ICD-10-CM

## 2017-07-20 DIAGNOSIS — I10 ESSENTIAL (PRIMARY) HYPERTENSION: ICD-10-CM

## 2017-07-20 PROCEDURE — 93291 INTERROG DEV EVAL SCRMS IP: CPT

## 2017-07-20 RX ORDER — GLIMEPIRIDE 2 MG/1
2 TABLET ORAL
Refills: 0 | Status: ACTIVE | COMMUNITY
Start: 2017-07-20

## 2017-07-20 RX ORDER — SITAGLIPTIN 25 MG/1
25 TABLET, FILM COATED ORAL
Refills: 0 | Status: ACTIVE | COMMUNITY
Start: 2017-07-20

## 2017-07-20 RX ORDER — LISINOPRIL 10 MG/1
10 TABLET ORAL
Refills: 0 | Status: ACTIVE | COMMUNITY
Start: 2017-07-20

## 2017-10-18 RX ORDER — METOPROLOL SUCCINATE 25 MG/1
25 TABLET, EXTENDED RELEASE ORAL
Refills: 0 | Status: DISCONTINUED | COMMUNITY
Start: 2017-07-20 | End: 2017-10-18

## 2017-10-18 RX ORDER — CARVEDILOL 25 MG/1
25 TABLET, FILM COATED ORAL TWICE DAILY
Qty: 180 | Refills: 3 | Status: ACTIVE | COMMUNITY
Start: 2017-10-18 | End: 1900-01-01

## 2017-10-31 ENCOUNTER — APPOINTMENT (OUTPATIENT)
Dept: HEART AND VASCULAR | Facility: CLINIC | Age: 82
End: 2017-10-31
Payer: MEDICARE

## 2017-10-31 VITALS
SYSTOLIC BLOOD PRESSURE: 127 MMHG | WEIGHT: 155 LBS | HEIGHT: 65 IN | DIASTOLIC BLOOD PRESSURE: 69 MMHG | HEART RATE: 66 BPM | BODY MASS INDEX: 25.83 KG/M2

## 2017-10-31 VITALS — HEIGHT: 65 IN | BODY MASS INDEX: 25.83 KG/M2 | WEIGHT: 155 LBS

## 2017-10-31 DIAGNOSIS — R55 SYNCOPE AND COLLAPSE: ICD-10-CM

## 2017-10-31 PROCEDURE — 93285 PRGRMG DEV EVAL SCRMS IP: CPT | Mod: 26

## 2017-10-31 PROCEDURE — 99214 OFFICE O/P EST MOD 30 MIN: CPT

## 2017-10-31 RX ORDER — SITAGLIPTIN 50 MG/1
50 TABLET, FILM COATED ORAL
Qty: 90 | Refills: 0 | Status: COMPLETED | COMMUNITY
Start: 2017-09-18

## 2017-10-31 RX ORDER — CARVEDILOL 12.5 MG/1
12.5 TABLET, FILM COATED ORAL
Qty: 60 | Refills: 0 | Status: COMPLETED | COMMUNITY
Start: 2017-05-30

## 2017-10-31 RX ORDER — HYDROCHLOROTHIAZIDE 25 MG/1
25 TABLET ORAL
Qty: 90 | Refills: 0 | Status: COMPLETED | COMMUNITY
Start: 2017-03-06

## 2017-10-31 RX ORDER — ATORVASTATIN CALCIUM 80 MG/1
80 TABLET, FILM COATED ORAL
Qty: 90 | Refills: 0 | Status: ACTIVE | COMMUNITY
Start: 2017-06-21

## 2017-10-31 RX ORDER — METHYLPREDNISOLONE 4 MG/1
4 TABLET ORAL
Qty: 21 | Refills: 0 | Status: COMPLETED | COMMUNITY
Start: 2017-08-21

## 2017-10-31 RX ORDER — LISINOPRIL 20 MG/1
20 TABLET ORAL
Qty: 90 | Refills: 0 | Status: COMPLETED | COMMUNITY
Start: 2017-06-21

## 2017-11-03 PROBLEM — R55 NEAR SYNCOPE: Status: ACTIVE | Noted: 2017-07-20

## 2017-12-05 ENCOUNTER — APPOINTMENT (OUTPATIENT)
Dept: HEART AND VASCULAR | Facility: CLINIC | Age: 82
End: 2017-12-05
Payer: MEDICARE

## 2017-12-05 VITALS
HEART RATE: 68 BPM | HEIGHT: 65 IN | SYSTOLIC BLOOD PRESSURE: 100 MMHG | WEIGHT: 155 LBS | DIASTOLIC BLOOD PRESSURE: 60 MMHG | BODY MASS INDEX: 25.83 KG/M2

## 2017-12-05 DIAGNOSIS — I47.2 VENTRICULAR TACHYCARDIA: ICD-10-CM

## 2017-12-05 DIAGNOSIS — I42.9 CARDIOMYOPATHY, UNSPECIFIED: ICD-10-CM

## 2017-12-05 PROCEDURE — 99214 OFFICE O/P EST MOD 30 MIN: CPT | Mod: 25

## 2017-12-05 PROCEDURE — 93285 PRGRMG DEV EVAL SCRMS IP: CPT

## 2018-03-07 ENCOUNTER — TRANSCRIPTION ENCOUNTER (OUTPATIENT)
Age: 83
End: 2018-03-07

## 2018-09-17 PROBLEM — I42.7 CARDIOMYOPATHY DUE TO DRUG AND EXTERNAL AGENT: Chronic | Status: ACTIVE | Noted: 2017-06-19

## 2018-09-17 PROBLEM — E11.9 TYPE 2 DIABETES MELLITUS WITHOUT COMPLICATIONS: Chronic | Status: ACTIVE | Noted: 2017-06-19

## 2018-09-17 PROBLEM — I10 ESSENTIAL (PRIMARY) HYPERTENSION: Chronic | Status: ACTIVE | Noted: 2017-06-19

## 2018-09-17 PROBLEM — I25.10 ATHEROSCLEROTIC HEART DISEASE OF NATIVE CORONARY ARTERY WITHOUT ANGINA PECTORIS: Chronic | Status: ACTIVE | Noted: 2017-06-19

## 2018-09-17 PROBLEM — C85.90 NON-HODGKIN LYMPHOMA, UNSPECIFIED, UNSPECIFIED SITE: Chronic | Status: ACTIVE | Noted: 2017-06-19

## 2018-09-17 PROBLEM — E78.5 HYPERLIPIDEMIA, UNSPECIFIED: Chronic | Status: ACTIVE | Noted: 2017-06-19

## 2018-09-18 ENCOUNTER — APPOINTMENT (OUTPATIENT)
Dept: COLORECTAL SURGERY | Facility: CLINIC | Age: 83
End: 2018-09-18

## 2018-10-03 ENCOUNTER — APPOINTMENT (OUTPATIENT)
Dept: COLORECTAL SURGERY | Facility: CLINIC | Age: 83
End: 2018-10-03
Payer: MEDICARE

## 2018-10-03 VITALS
WEIGHT: 154 LBS | SYSTOLIC BLOOD PRESSURE: 138 MMHG | OXYGEN SATURATION: 98 % | HEIGHT: 64 IN | BODY MASS INDEX: 26.29 KG/M2 | HEART RATE: 69 BPM | RESPIRATION RATE: 14 BRPM | DIASTOLIC BLOOD PRESSURE: 92 MMHG

## 2018-10-03 DIAGNOSIS — K62.5 HEMORRHAGE OF ANUS AND RECTUM: ICD-10-CM

## 2018-10-03 DIAGNOSIS — Z80.3 FAMILY HISTORY OF MALIGNANT NEOPLASM OF BREAST: ICD-10-CM

## 2018-10-03 DIAGNOSIS — Z87.898 PERSONAL HISTORY OF OTHER SPECIFIED CONDITIONS: ICD-10-CM

## 2018-10-03 DIAGNOSIS — Z80.0 FAMILY HISTORY OF MALIGNANT NEOPLASM OF DIGESTIVE ORGANS: ICD-10-CM

## 2018-10-03 DIAGNOSIS — Z85.79 PERSONAL HISTORY OF OTHER MALIGNANT NEOPLASMS OF LYMPHOID, HEMATOPOIETIC AND RELATED TISSUES: ICD-10-CM

## 2018-10-03 PROCEDURE — 99203 OFFICE O/P NEW LOW 30 MIN: CPT

## 2018-10-03 PROCEDURE — 45300 PROCTOSIGMOIDOSCOPY DX: CPT

## 2018-10-03 RX ORDER — ATORVASTATIN CALCIUM 40 MG/1
40 TABLET, FILM COATED ORAL
Refills: 0 | Status: DISCONTINUED | COMMUNITY
Start: 2017-07-20 | End: 2018-10-03

## 2018-10-03 RX ORDER — PNV NO.95/FERROUS FUM/FOLIC AC 28MG-0.8MG
TABLET ORAL
Refills: 0 | Status: ACTIVE | COMMUNITY

## 2019-10-30 ENCOUNTER — APPOINTMENT (OUTPATIENT)
Dept: OPHTHALMOLOGY | Facility: CLINIC | Age: 84
End: 2019-10-30
Payer: MEDICARE

## 2019-10-30 ENCOUNTER — NON-APPOINTMENT (OUTPATIENT)
Age: 84
End: 2019-10-30

## 2019-10-30 PROCEDURE — 92083 EXTENDED VISUAL FIELD XM: CPT

## 2019-10-30 PROCEDURE — 92020 GONIOSCOPY: CPT

## 2019-10-30 PROCEDURE — 92004 COMPRE OPH EXAM NEW PT 1/>: CPT

## 2019-11-18 ENCOUNTER — APPOINTMENT (OUTPATIENT)
Dept: OPHTHALMOLOGY | Facility: CLINIC | Age: 84
End: 2019-11-18
Payer: MEDICARE

## 2019-11-18 ENCOUNTER — NON-APPOINTMENT (OUTPATIENT)
Age: 84
End: 2019-11-18

## 2019-11-18 PROCEDURE — 92134 CPTRZ OPH DX IMG PST SGM RTA: CPT

## 2019-11-18 PROCEDURE — 92136 OPHTHALMIC BIOMETRY: CPT

## 2019-11-18 PROCEDURE — 92014 COMPRE OPH EXAM EST PT 1/>: CPT

## 2020-01-31 ENCOUNTER — APPOINTMENT (OUTPATIENT)
Dept: OPHTHALMOLOGY | Facility: CLINIC | Age: 85
End: 2020-01-31

## 2021-02-17 ENCOUNTER — APPOINTMENT (OUTPATIENT)
Dept: OPHTHALMOLOGY | Facility: CLINIC | Age: 86
End: 2021-02-17
Payer: MEDICARE

## 2021-02-17 ENCOUNTER — NON-APPOINTMENT (OUTPATIENT)
Age: 86
End: 2021-02-17

## 2021-02-17 PROCEDURE — 92025 CPTRIZED CORNEAL TOPOGRAPHY: CPT

## 2021-02-17 PROCEDURE — 92014 COMPRE OPH EXAM EST PT 1/>: CPT

## 2021-02-17 PROCEDURE — 92136 OPHTHALMIC BIOMETRY: CPT

## 2021-02-17 PROCEDURE — 92250 FUNDUS PHOTOGRAPHY W/I&R: CPT

## 2021-03-14 DIAGNOSIS — Z01.818 ENCOUNTER FOR OTHER PREPROCEDURAL EXAMINATION: ICD-10-CM

## 2021-03-19 ENCOUNTER — APPOINTMENT (OUTPATIENT)
Dept: DISASTER EMERGENCY | Facility: CLINIC | Age: 86
End: 2021-03-19

## 2021-03-20 LAB — SARS-COV-2 N GENE NPH QL NAA+PROBE: NOT DETECTED

## 2021-03-21 ENCOUNTER — TRANSCRIPTION ENCOUNTER (OUTPATIENT)
Age: 86
End: 2021-03-21

## 2021-03-22 ENCOUNTER — NON-APPOINTMENT (OUTPATIENT)
Age: 86
End: 2021-03-22

## 2021-03-22 ENCOUNTER — OUTPATIENT (OUTPATIENT)
Dept: OUTPATIENT SERVICES | Facility: HOSPITAL | Age: 86
LOS: 1 days | Discharge: HOME | End: 2021-03-22

## 2021-03-22 ENCOUNTER — APPOINTMENT (OUTPATIENT)
Dept: OPHTHALMOLOGY | Facility: AMBULATORY SURGERY CENTER | Age: 86
End: 2021-03-22
Payer: MEDICARE

## 2021-03-22 DIAGNOSIS — Z90.81 ACQUIRED ABSENCE OF SPLEEN: Chronic | ICD-10-CM

## 2021-03-22 DIAGNOSIS — N20.0 CALCULUS OF KIDNEY: Chronic | ICD-10-CM

## 2021-03-22 LAB — GLUCOSE BLDC GLUCOMTR-MCNC: 122 MG/DL — HIGH (ref 70–99)

## 2021-03-22 PROCEDURE — 66984 XCAPSL CTRC RMVL W/O ECP: CPT | Mod: RT

## 2021-03-23 ENCOUNTER — NON-APPOINTMENT (OUTPATIENT)
Age: 86
End: 2021-03-23

## 2021-03-23 ENCOUNTER — APPOINTMENT (OUTPATIENT)
Dept: OPHTHALMOLOGY | Facility: CLINIC | Age: 86
End: 2021-03-23
Payer: MEDICARE

## 2021-03-23 PROCEDURE — 99024 POSTOP FOLLOW-UP VISIT: CPT

## 2021-03-30 ENCOUNTER — NON-APPOINTMENT (OUTPATIENT)
Age: 86
End: 2021-03-30

## 2021-03-30 ENCOUNTER — APPOINTMENT (OUTPATIENT)
Dept: OPHTHALMOLOGY | Facility: CLINIC | Age: 86
End: 2021-03-30
Payer: MEDICARE

## 2021-03-30 PROCEDURE — 99024 POSTOP FOLLOW-UP VISIT: CPT

## 2021-04-21 ENCOUNTER — NON-APPOINTMENT (OUTPATIENT)
Age: 86
End: 2021-04-21

## 2021-04-21 ENCOUNTER — APPOINTMENT (OUTPATIENT)
Dept: OPHTHALMOLOGY | Facility: CLINIC | Age: 86
End: 2021-04-21
Payer: MEDICARE

## 2021-04-21 PROCEDURE — 99024 POSTOP FOLLOW-UP VISIT: CPT

## 2021-05-19 ENCOUNTER — APPOINTMENT (OUTPATIENT)
Dept: OPHTHALMOLOGY | Facility: CLINIC | Age: 86
End: 2021-05-19
Payer: MEDICARE

## 2021-05-19 ENCOUNTER — NON-APPOINTMENT (OUTPATIENT)
Age: 86
End: 2021-05-19

## 2021-05-19 PROCEDURE — 99024 POSTOP FOLLOW-UP VISIT: CPT

## 2021-09-30 ENCOUNTER — NON-APPOINTMENT (OUTPATIENT)
Age: 86
End: 2021-09-30

## 2021-09-30 ENCOUNTER — APPOINTMENT (OUTPATIENT)
Dept: OPHTHALMOLOGY | Facility: CLINIC | Age: 86
End: 2021-09-30
Payer: MEDICARE

## 2021-09-30 PROCEDURE — 92012 INTRM OPH EXAM EST PATIENT: CPT

## 2022-03-18 ENCOUNTER — APPOINTMENT (OUTPATIENT)
Dept: OPHTHALMOLOGY | Facility: CLINIC | Age: 87
End: 2022-03-18

## 2022-04-21 ENCOUNTER — APPOINTMENT (OUTPATIENT)
Dept: OPHTHALMOLOGY | Facility: CLINIC | Age: 87
End: 2022-04-21

## 2022-04-27 ENCOUNTER — NON-APPOINTMENT (OUTPATIENT)
Age: 87
End: 2022-04-27

## 2022-04-27 ENCOUNTER — APPOINTMENT (OUTPATIENT)
Dept: OPHTHALMOLOGY | Facility: CLINIC | Age: 87
End: 2022-04-27
Payer: MEDICARE

## 2022-04-27 PROCEDURE — 92012 INTRM OPH EXAM EST PATIENT: CPT

## 2022-05-06 NOTE — PHYSICAL THERAPY INITIAL EVALUATION ADULT - ADL SKILLS, REHAB EVAL
independent Adjacent Tissue Transfer Text: The defect edges were debeveled with a #15 scalpel blade.  Given the location of the defect and the proximity to free margins an adjacent tissue transfer was deemed most appropriate.  Using a sterile surgical marker, an appropriate flap was drawn incorporating the defect and placing the expected incisions within the relaxed skin tension lines where possible.    The area thus outlined was incised deep to adipose tissue with a #15 scalpel blade.  The skin margins were undermined to an appropriate distance in all directions utilizing iris scissors.

## 2022-06-24 ENCOUNTER — EMERGENCY (EMERGENCY)
Facility: HOSPITAL | Age: 87
LOS: 1 days | Discharge: HOME | End: 2022-06-24
Attending: EMERGENCY MEDICINE
Payer: MEDICARE

## 2022-06-24 VITALS
OXYGEN SATURATION: 97 % | TEMPERATURE: 98 F | RESPIRATION RATE: 18 BRPM | DIASTOLIC BLOOD PRESSURE: 70 MMHG | SYSTOLIC BLOOD PRESSURE: 108 MMHG | HEART RATE: 71 BPM

## 2022-06-24 VITALS
HEART RATE: 69 BPM | TEMPERATURE: 98 F | SYSTOLIC BLOOD PRESSURE: 170 MMHG | DIASTOLIC BLOOD PRESSURE: 72 MMHG | RESPIRATION RATE: 17 BRPM | WEIGHT: 149.91 LBS | OXYGEN SATURATION: 100 % | HEIGHT: 66 IN

## 2022-06-24 DIAGNOSIS — N20.0 CALCULUS OF KIDNEY: Chronic | ICD-10-CM

## 2022-06-24 DIAGNOSIS — Z90.81 ACQUIRED ABSENCE OF SPLEEN: Chronic | ICD-10-CM

## 2022-06-24 LAB
ALBUMIN SERPL ELPH-MCNC: 3.7 G/DL — SIGNIFICANT CHANGE UP (ref 3.5–5)
ALP SERPL-CCNC: 43 U/L — SIGNIFICANT CHANGE UP (ref 40–120)
ALT FLD-CCNC: 18 U/L DA — SIGNIFICANT CHANGE UP (ref 10–60)
ANION GAP SERPL CALC-SCNC: 8 MMOL/L — SIGNIFICANT CHANGE UP (ref 5–17)
APPEARANCE UR: CLEAR — SIGNIFICANT CHANGE UP
APTT BLD: 25.1 SEC — LOW (ref 27.5–35.5)
AST SERPL-CCNC: 23 U/L — SIGNIFICANT CHANGE UP (ref 10–40)
BASOPHILS # BLD AUTO: 0.08 K/UL — SIGNIFICANT CHANGE UP (ref 0–0.2)
BASOPHILS NFR BLD AUTO: 0.5 % — SIGNIFICANT CHANGE UP (ref 0–2)
BILIRUB SERPL-MCNC: 1 MG/DL — SIGNIFICANT CHANGE UP (ref 0.2–1.2)
BILIRUB UR-MCNC: NEGATIVE — SIGNIFICANT CHANGE UP
BUN SERPL-MCNC: 20 MG/DL — HIGH (ref 7–18)
CALCIUM SERPL-MCNC: 9.8 MG/DL — SIGNIFICANT CHANGE UP (ref 8.4–10.5)
CHLORIDE SERPL-SCNC: 97 MMOL/L — SIGNIFICANT CHANGE UP (ref 96–108)
CO2 SERPL-SCNC: 25 MMOL/L — SIGNIFICANT CHANGE UP (ref 22–31)
COLOR SPEC: YELLOW — SIGNIFICANT CHANGE UP
CREAT SERPL-MCNC: 1.38 MG/DL — HIGH (ref 0.5–1.3)
DIFF PNL FLD: NEGATIVE — SIGNIFICANT CHANGE UP
EGFR: 49 ML/MIN/1.73M2 — LOW
EOSINOPHIL # BLD AUTO: 0.13 K/UL — SIGNIFICANT CHANGE UP (ref 0–0.5)
EOSINOPHIL NFR BLD AUTO: 0.8 % — SIGNIFICANT CHANGE UP (ref 0–6)
GLUCOSE SERPL-MCNC: 171 MG/DL — HIGH (ref 70–99)
GLUCOSE UR QL: NEGATIVE — SIGNIFICANT CHANGE UP
HCT VFR BLD CALC: 36.4 % — LOW (ref 39–50)
HGB BLD-MCNC: 11.8 G/DL — LOW (ref 13–17)
IMM GRANULOCYTES NFR BLD AUTO: 0.4 % — SIGNIFICANT CHANGE UP (ref 0–1.5)
INR BLD: 1.03 RATIO — SIGNIFICANT CHANGE UP (ref 0.88–1.16)
KETONES UR-MCNC: ABNORMAL
LACTATE SERPL-SCNC: 1.3 MMOL/L — SIGNIFICANT CHANGE UP (ref 0.7–2)
LEUKOCYTE ESTERASE UR-ACNC: ABNORMAL
LIDOCAIN IGE QN: 253 U/L — SIGNIFICANT CHANGE UP (ref 73–393)
LYMPHOCYTES # BLD AUTO: 14.3 % — SIGNIFICANT CHANGE UP (ref 13–44)
LYMPHOCYTES # BLD AUTO: 2.41 K/UL — SIGNIFICANT CHANGE UP (ref 1–3.3)
MCHC RBC-ENTMCNC: 29.4 PG — SIGNIFICANT CHANGE UP (ref 27–34)
MCHC RBC-ENTMCNC: 32.4 GM/DL — SIGNIFICANT CHANGE UP (ref 32–36)
MCV RBC AUTO: 90.5 FL — SIGNIFICANT CHANGE UP (ref 80–100)
MONOCYTES # BLD AUTO: 1.36 K/UL — HIGH (ref 0–0.9)
MONOCYTES NFR BLD AUTO: 8 % — SIGNIFICANT CHANGE UP (ref 2–14)
NEUTROPHILS # BLD AUTO: 12.85 K/UL — HIGH (ref 1.8–7.4)
NEUTROPHILS NFR BLD AUTO: 76 % — SIGNIFICANT CHANGE UP (ref 43–77)
NITRITE UR-MCNC: NEGATIVE — SIGNIFICANT CHANGE UP
NRBC # BLD: 0 /100 WBCS — SIGNIFICANT CHANGE UP (ref 0–0)
PH UR: 7 — SIGNIFICANT CHANGE UP (ref 5–8)
PLATELET # BLD AUTO: 208 K/UL — SIGNIFICANT CHANGE UP (ref 150–400)
POTASSIUM SERPL-MCNC: 5 MMOL/L — SIGNIFICANT CHANGE UP (ref 3.5–5.3)
POTASSIUM SERPL-SCNC: 5 MMOL/L — SIGNIFICANT CHANGE UP (ref 3.5–5.3)
PROT SERPL-MCNC: 7.9 G/DL — SIGNIFICANT CHANGE UP (ref 6–8.3)
PROT UR-MCNC: 15
PROTHROM AB SERPL-ACNC: 12.3 SEC — SIGNIFICANT CHANGE UP (ref 10.5–13.4)
RBC # BLD: 4.02 M/UL — LOW (ref 4.2–5.8)
RBC # FLD: 14.5 % — SIGNIFICANT CHANGE UP (ref 10.3–14.5)
SARS-COV-2 RNA SPEC QL NAA+PROBE: SIGNIFICANT CHANGE UP
SODIUM SERPL-SCNC: 130 MMOL/L — LOW (ref 135–145)
SP GR SPEC: 1.01 — SIGNIFICANT CHANGE UP (ref 1.01–1.02)
TROPONIN I, HIGH SENSITIVITY RESULT: 8.3 NG/L — SIGNIFICANT CHANGE UP
UROBILINOGEN FLD QL: NEGATIVE — SIGNIFICANT CHANGE UP
WBC # BLD: 16.9 K/UL — HIGH (ref 3.8–10.5)
WBC # FLD AUTO: 16.9 K/UL — HIGH (ref 3.8–10.5)

## 2022-06-24 PROCEDURE — 99285 EMERGENCY DEPT VISIT HI MDM: CPT

## 2022-06-24 PROCEDURE — 74177 CT ABD & PELVIS W/CONTRAST: CPT | Mod: 26,MA

## 2022-06-24 RX ORDER — ONDANSETRON 8 MG/1
4 TABLET, FILM COATED ORAL ONCE
Refills: 0 | Status: COMPLETED | OUTPATIENT
Start: 2022-06-24 | End: 2022-06-24

## 2022-06-24 RX ORDER — MORPHINE SULFATE 50 MG/1
2 CAPSULE, EXTENDED RELEASE ORAL ONCE
Refills: 0 | Status: DISCONTINUED | OUTPATIENT
Start: 2022-06-24 | End: 2022-06-24

## 2022-06-24 RX ORDER — LATANOPROST 0.05 MG/ML
1 SOLUTION/ DROPS OPHTHALMIC; TOPICAL AT BEDTIME
Refills: 0 | Status: DISCONTINUED | OUTPATIENT
Start: 2022-06-24 | End: 2022-06-28

## 2022-06-24 RX ADMIN — ONDANSETRON 4 MILLIGRAM(S): 8 TABLET, FILM COATED ORAL at 17:46

## 2022-06-24 RX ADMIN — MORPHINE SULFATE 2 MILLIGRAM(S): 50 CAPSULE, EXTENDED RELEASE ORAL at 17:46

## 2022-06-24 RX ADMIN — MORPHINE SULFATE 2 MILLIGRAM(S): 50 CAPSULE, EXTENDED RELEASE ORAL at 18:16

## 2022-06-24 NOTE — ED PROVIDER NOTE - PROGRESS NOTE DETAILS
EKG - nsr, rate 73, Qtc 462, no ectopy, no ischemic changes labs - WBC 16, Na 130, covid negative  UA - no UTI  CT - no acute abnormalities  Pt states he feels much better. Ambulating in ED with cane. Offered admission, but pt wants to go home; shared decision making used. He will call family to come pick him up. Has meclizine at home. Discussed indications for patient return to ED. Patient understood.

## 2022-06-24 NOTE — ED PROVIDER NOTE - CLINICAL SUMMARY MEDICAL DECISION MAKING FREE TEXT BOX
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 Also telling me he has constipation and abdo discomfort from vomiting.   Plan - CT abdo pelvis to eval for obstruction, EKG, meds, possible admission for intractable vomiting or ambulatory dysfunction

## 2022-06-24 NOTE — ED PROVIDER NOTE - PHYSICAL EXAMINATION
GENERAL: well appearing, no acute distress   HEAD: atraumatic   EYES: EOMI   ENT: moist oral mucosa   CARDIAC: regular rate, no edema   RESPIRATORY: no increased work of breathing, lungs CTAB  ABDO: soft NTND  MUSCULOSKELETAL: no deformity   NEUROLOGICAL: alert, spontaneous movement of extremities   SKIN: no visible rash  PSYCHIATRIC: cooperative

## 2022-06-24 NOTE — ED ADULT NURSE NOTE - OBJECTIVE STATEMENT
patient presents to ED with c/o dizziness and abdominal pain, nausea and vomiting "I ate and the food is stuck" denies chest pain, no difficulty breathing.

## 2022-06-24 NOTE — ED PROVIDER NOTE - OBJECTIVE STATEMENT
89 yo M pmh of lymphoma, glaucoma, HTN presents with dizziness and vomiting after getting MRI of brain. 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 Denies HA, CP, syncope, other acute complaints. Oriented - self; Oriented - place; Oriented - time

## 2022-06-24 NOTE — ED PROVIDER NOTE - PATIENT PORTAL LINK FT
You can access the FollowMyHealth Patient Portal offered by [facility] by registering at the following website: [email or web address]. By joining [facility]’s FollowMyHealth portal, you will also be able to view your health information using other applications (apps) compatible with our system.

## 2022-06-25 ENCOUNTER — INPATIENT (INPATIENT)
Facility: HOSPITAL | Age: 87
LOS: 5 days | Discharge: HOME | DRG: 392 | End: 2022-07-01
Attending: STUDENT IN AN ORGANIZED HEALTH CARE EDUCATION/TRAINING PROGRAM | Admitting: STUDENT IN AN ORGANIZED HEALTH CARE EDUCATION/TRAINING PROGRAM
Payer: MEDICARE

## 2022-06-25 VITALS
HEIGHT: 66 IN | WEIGHT: 149.91 LBS | DIASTOLIC BLOOD PRESSURE: 84 MMHG | RESPIRATION RATE: 16 BRPM | TEMPERATURE: 98 F | HEART RATE: 66 BPM | OXYGEN SATURATION: 100 % | SYSTOLIC BLOOD PRESSURE: 130 MMHG

## 2022-06-25 DIAGNOSIS — Z90.81 ACQUIRED ABSENCE OF SPLEEN: Chronic | ICD-10-CM

## 2022-06-25 DIAGNOSIS — N20.0 CALCULUS OF KIDNEY: Chronic | ICD-10-CM

## 2022-06-25 LAB
ALBUMIN SERPL ELPH-MCNC: 3.5 G/DL — SIGNIFICANT CHANGE UP (ref 3.5–5)
ALP SERPL-CCNC: 39 U/L — LOW (ref 40–120)
ALT FLD-CCNC: 16 U/L DA — SIGNIFICANT CHANGE UP (ref 10–60)
ANION GAP SERPL CALC-SCNC: 11 MMOL/L — SIGNIFICANT CHANGE UP (ref 5–17)
AST SERPL-CCNC: 15 U/L — SIGNIFICANT CHANGE UP (ref 10–40)
BASOPHILS # BLD AUTO: 0.03 K/UL — SIGNIFICANT CHANGE UP (ref 0–0.2)
BASOPHILS NFR BLD AUTO: 0.3 % — SIGNIFICANT CHANGE UP (ref 0–2)
BILIRUB SERPL-MCNC: 1.1 MG/DL — SIGNIFICANT CHANGE UP (ref 0.2–1.2)
BUN SERPL-MCNC: 23 MG/DL — HIGH (ref 7–18)
CALCIUM SERPL-MCNC: 9.1 MG/DL — SIGNIFICANT CHANGE UP (ref 8.4–10.5)
CHLORIDE SERPL-SCNC: 100 MMOL/L — SIGNIFICANT CHANGE UP (ref 96–108)
CK SERPL-CCNC: 106 U/L — SIGNIFICANT CHANGE UP (ref 35–232)
CO2 SERPL-SCNC: 23 MMOL/L — SIGNIFICANT CHANGE UP (ref 22–31)
CREAT SERPL-MCNC: 1.39 MG/DL — HIGH (ref 0.5–1.3)
EGFR: 49 ML/MIN/1.73M2 — LOW
EOSINOPHIL # BLD AUTO: 0.03 K/UL — SIGNIFICANT CHANGE UP (ref 0–0.5)
EOSINOPHIL NFR BLD AUTO: 0.3 % — SIGNIFICANT CHANGE UP (ref 0–6)
GLUCOSE SERPL-MCNC: 129 MG/DL — HIGH (ref 70–99)
HCT VFR BLD CALC: 35.9 % — LOW (ref 39–50)
HGB BLD-MCNC: 11.7 G/DL — LOW (ref 13–17)
IMM GRANULOCYTES NFR BLD AUTO: 0.3 % — SIGNIFICANT CHANGE UP (ref 0–1.5)
LIDOCAIN IGE QN: 215 U/L — SIGNIFICANT CHANGE UP (ref 73–393)
LYMPHOCYTES # BLD AUTO: 1.81 K/UL — SIGNIFICANT CHANGE UP (ref 1–3.3)
LYMPHOCYTES # BLD AUTO: 15.6 % — SIGNIFICANT CHANGE UP (ref 13–44)
MAGNESIUM SERPL-MCNC: 2.1 MG/DL — SIGNIFICANT CHANGE UP (ref 1.6–2.6)
MCHC RBC-ENTMCNC: 29.3 PG — SIGNIFICANT CHANGE UP (ref 27–34)
MCHC RBC-ENTMCNC: 32.6 GM/DL — SIGNIFICANT CHANGE UP (ref 32–36)
MCV RBC AUTO: 90 FL — SIGNIFICANT CHANGE UP (ref 80–100)
MONOCYTES # BLD AUTO: 0.83 K/UL — SIGNIFICANT CHANGE UP (ref 0–0.9)
MONOCYTES NFR BLD AUTO: 7.2 % — SIGNIFICANT CHANGE UP (ref 2–14)
NEUTROPHILS # BLD AUTO: 8.84 K/UL — HIGH (ref 1.8–7.4)
NEUTROPHILS NFR BLD AUTO: 76.3 % — SIGNIFICANT CHANGE UP (ref 43–77)
NRBC # BLD: 0 /100 WBCS — SIGNIFICANT CHANGE UP (ref 0–0)
PLATELET # BLD AUTO: 203 K/UL — SIGNIFICANT CHANGE UP (ref 150–400)
POTASSIUM SERPL-MCNC: 4.3 MMOL/L — SIGNIFICANT CHANGE UP (ref 3.5–5.3)
POTASSIUM SERPL-SCNC: 4.3 MMOL/L — SIGNIFICANT CHANGE UP (ref 3.5–5.3)
PROT SERPL-MCNC: 7.1 G/DL — SIGNIFICANT CHANGE UP (ref 6–8.3)
RBC # BLD: 3.99 M/UL — LOW (ref 4.2–5.8)
RBC # FLD: 14.6 % — HIGH (ref 10.3–14.5)
SARS-COV-2 RNA SPEC QL NAA+PROBE: SIGNIFICANT CHANGE UP
SODIUM SERPL-SCNC: 134 MMOL/L — LOW (ref 135–145)
TROPONIN I, HIGH SENSITIVITY RESULT: 16.7 NG/L — SIGNIFICANT CHANGE UP
WBC # BLD: 11.57 K/UL — HIGH (ref 3.8–10.5)
WBC # FLD AUTO: 11.57 K/UL — HIGH (ref 3.8–10.5)

## 2022-06-25 PROCEDURE — 99285 EMERGENCY DEPT VISIT HI MDM: CPT

## 2022-06-25 NOTE — ED PROVIDER NOTE - OBJECTIVE STATEMENT
88 year old male with PMHx of diabetes mellitus and high blood pressure is presenting to the ED with chief complaint of abdominal pain, generalized dizziness, weakness, and unable to tolerate PO for the last 2 days. Patient came in yesterday with vomiting, dizziness, and abdominal pain. He was discharged yesterday when he felt better and felt well enough to go home. Today patient spent most of his time in bed and only was able to take a few bites, which he still vomited back out. Currently the abdominal pain and dizziness have improved. See last ED notes for more info. NKDA

## 2022-06-25 NOTE — ED ADULT NURSE NOTE - NSIMPLEMENTINTERV_GEN_ALL_ED
Implemented All Universal Safety Interventions:  [place] to call system. Call bell, personal items and telephone within reach. Instruct patient to call for assistance. Room bathroom lighting operational. Non-slip footwear when patient is off stretcher. Physically safe environment: no spills, clutter or unnecessary equipment. Stretcher in lowest position, wheels locked, appropriate side rails in place.

## 2022-06-25 NOTE — ED PROVIDER NOTE - CLINICAL SUMMARY MEDICAL DECISION MAKING FREE TEXT BOX
patient with intractable nausea, inability to tolerate PO, generalized dizziness, and weakness. Will hydrate and get labs. Reassess.

## 2022-06-26 DIAGNOSIS — Z29.9 ENCOUNTER FOR PROPHYLACTIC MEASURES, UNSPECIFIED: ICD-10-CM

## 2022-06-26 DIAGNOSIS — E78.5 HYPERLIPIDEMIA, UNSPECIFIED: ICD-10-CM

## 2022-06-26 DIAGNOSIS — I10 ESSENTIAL (PRIMARY) HYPERTENSION: ICD-10-CM

## 2022-06-26 DIAGNOSIS — R10.9 UNSPECIFIED ABDOMINAL PAIN: ICD-10-CM

## 2022-06-26 DIAGNOSIS — R11.10 VOMITING, UNSPECIFIED: ICD-10-CM

## 2022-06-26 DIAGNOSIS — E11.9 TYPE 2 DIABETES MELLITUS WITHOUT COMPLICATIONS: ICD-10-CM

## 2022-06-26 DIAGNOSIS — R42 DIZZINESS AND GIDDINESS: ICD-10-CM

## 2022-06-26 LAB
ANION GAP SERPL CALC-SCNC: 9 MMOL/L — SIGNIFICANT CHANGE UP (ref 5–17)
BUN SERPL-MCNC: 23 MG/DL — HIGH (ref 7–18)
CALCIUM SERPL-MCNC: 9.6 MG/DL — SIGNIFICANT CHANGE UP (ref 8.4–10.5)
CHLORIDE SERPL-SCNC: 101 MMOL/L — SIGNIFICANT CHANGE UP (ref 96–108)
CO2 SERPL-SCNC: 27 MMOL/L — SIGNIFICANT CHANGE UP (ref 22–31)
CREAT SERPL-MCNC: 1.43 MG/DL — HIGH (ref 0.5–1.3)
CULTURE RESULTS: SIGNIFICANT CHANGE UP
EGFR: 47 ML/MIN/1.73M2 — LOW
GLUCOSE BLDC GLUCOMTR-MCNC: 115 MG/DL — HIGH (ref 70–99)
GLUCOSE BLDC GLUCOMTR-MCNC: 215 MG/DL — HIGH (ref 70–99)
GLUCOSE SERPL-MCNC: 104 MG/DL — HIGH (ref 70–99)
POTASSIUM SERPL-MCNC: 4.4 MMOL/L — SIGNIFICANT CHANGE UP (ref 3.5–5.3)
POTASSIUM SERPL-SCNC: 4.4 MMOL/L — SIGNIFICANT CHANGE UP (ref 3.5–5.3)
SODIUM SERPL-SCNC: 137 MMOL/L — SIGNIFICANT CHANGE UP (ref 135–145)
SPECIMEN SOURCE: SIGNIFICANT CHANGE UP

## 2022-06-26 PROCEDURE — 12345: CPT | Mod: NC

## 2022-06-26 RX ORDER — INSULIN LISPRO 100/ML
VIAL (ML) SUBCUTANEOUS
Refills: 0 | Status: DISCONTINUED | OUTPATIENT
Start: 2022-06-26 | End: 2022-07-01

## 2022-06-26 RX ORDER — SODIUM CHLORIDE 9 MG/ML
1000 INJECTION INTRAMUSCULAR; INTRAVENOUS; SUBCUTANEOUS
Refills: 0 | Status: DISCONTINUED | OUTPATIENT
Start: 2022-06-26 | End: 2022-06-26

## 2022-06-26 RX ORDER — ACETAMINOPHEN 500 MG
650 TABLET ORAL EVERY 6 HOURS
Refills: 0 | Status: DISCONTINUED | OUTPATIENT
Start: 2022-06-26 | End: 2022-07-01

## 2022-06-26 RX ORDER — HEPARIN SODIUM 5000 [USP'U]/ML
5000 INJECTION INTRAVENOUS; SUBCUTANEOUS EVERY 12 HOURS
Refills: 0 | Status: DISCONTINUED | OUTPATIENT
Start: 2022-06-26 | End: 2022-06-28

## 2022-06-26 RX ORDER — ATORVASTATIN CALCIUM 80 MG/1
40 TABLET, FILM COATED ORAL AT BEDTIME
Refills: 0 | Status: DISCONTINUED | OUTPATIENT
Start: 2022-06-26 | End: 2022-07-01

## 2022-06-26 RX ORDER — ONDANSETRON 8 MG/1
4 TABLET, FILM COATED ORAL EVERY 8 HOURS
Refills: 0 | Status: DISCONTINUED | OUTPATIENT
Start: 2022-06-26 | End: 2022-07-01

## 2022-06-26 RX ORDER — MECLIZINE HCL 12.5 MG
25 TABLET ORAL THREE TIMES A DAY
Refills: 0 | Status: DISCONTINUED | OUTPATIENT
Start: 2022-06-26 | End: 2022-07-01

## 2022-06-26 RX ORDER — CARVEDILOL PHOSPHATE 80 MG/1
12.5 CAPSULE, EXTENDED RELEASE ORAL EVERY 12 HOURS
Refills: 0 | Status: DISCONTINUED | OUTPATIENT
Start: 2022-06-26 | End: 2022-07-01

## 2022-06-26 RX ORDER — SODIUM CHLORIDE 9 MG/ML
1000 INJECTION INTRAMUSCULAR; INTRAVENOUS; SUBCUTANEOUS
Refills: 0 | Status: DISCONTINUED | OUTPATIENT
Start: 2022-06-26 | End: 2022-06-29

## 2022-06-26 RX ORDER — BIMATOPROST 0.3 MG/ML
1 SOLUTION/ DROPS OPHTHALMIC AT BEDTIME
Refills: 0 | Status: DISCONTINUED | OUTPATIENT
Start: 2022-06-26 | End: 2022-07-01

## 2022-06-26 RX ORDER — ASPIRIN/CALCIUM CARB/MAGNESIUM 324 MG
81 TABLET ORAL DAILY
Refills: 0 | Status: DISCONTINUED | OUTPATIENT
Start: 2022-06-26 | End: 2022-06-30

## 2022-06-26 RX ORDER — LATANOPROST 0.05 MG/ML
1 SOLUTION/ DROPS OPHTHALMIC; TOPICAL AT BEDTIME
Refills: 0 | Status: DISCONTINUED | OUTPATIENT
Start: 2022-06-26 | End: 2022-06-26

## 2022-06-26 RX ADMIN — CARVEDILOL PHOSPHATE 12.5 MILLIGRAM(S): 80 CAPSULE, EXTENDED RELEASE ORAL at 05:44

## 2022-06-26 RX ADMIN — CARVEDILOL PHOSPHATE 12.5 MILLIGRAM(S): 80 CAPSULE, EXTENDED RELEASE ORAL at 18:07

## 2022-06-26 RX ADMIN — Medication 81 MILLIGRAM(S): at 12:39

## 2022-06-26 RX ADMIN — HEPARIN SODIUM 5000 UNIT(S): 5000 INJECTION INTRAVENOUS; SUBCUTANEOUS at 18:07

## 2022-06-26 RX ADMIN — SODIUM CHLORIDE 80 MILLILITER(S): 9 INJECTION INTRAMUSCULAR; INTRAVENOUS; SUBCUTANEOUS at 15:08

## 2022-06-26 RX ADMIN — Medication 25 MILLIGRAM(S): at 12:39

## 2022-06-26 RX ADMIN — ATORVASTATIN CALCIUM 40 MILLIGRAM(S): 80 TABLET, FILM COATED ORAL at 22:25

## 2022-06-26 RX ADMIN — SODIUM CHLORIDE 80 MILLILITER(S): 9 INJECTION INTRAMUSCULAR; INTRAVENOUS; SUBCUTANEOUS at 22:25

## 2022-06-26 NOTE — PATIENT PROFILE ADULT - NSPRESCRALCSCORE_GEN_A_NUR_CAL
Pt met criteria for discharge -s/p L4-L5 revision , decompression , laminectomy , dissectomy, TLIF spacer with lumbar area Prevena wound vac and purple foam and Hemovac x2- pt had pre -op back Pain radiating bilaterally to both lower legs and LLE numbness and weakness - initial post op assessment reveals equal strength to both legs and pt denies any numbness at present- hemodynamically stable-Rose patent and draining clear yellow urine -expresses some Pain relief s.p IV Dilaudid - endorsed to ZACK Velazquez -pt left unit via bed back to his room 838.2
2

## 2022-06-26 NOTE — H&P ADULT - ASSESSMENT
Patient is 88 year old male with past medical history of diabetes mellitus and hypertension, Vertigo is presenting to the ED with chief complaint of abdominal pain, generalized dizziness, weakness, and unable to tolerate PO for the last 2 days. Last night, Patient came to ED with vomiting, dizziness, and abdominal pain. He was discharged yesterday when he felt better and felt well enough to go home. Today patient spent most of his time in bed and only was able to take a few bites, which he still vomited back out, However, Patient feels much better in ED by the time of examination and was asking if he can go home. CT abdomen and pelvis IV contrast didn't show any acute abnormalities. Patient will be admitted for further monitoring.

## 2022-06-26 NOTE — H&P ADULT - NSHPPHYSICALEXAM_GEN_ALL_CORE
ICU Vital Signs Last 24 Hrs  T(C): 36.5 (25 Jun 2022 21:07), Max: 36.5 (25 Jun 2022 21:07)  T(F): 97.7 (25 Jun 2022 21:07), Max: 97.7 (25 Jun 2022 21:07)  HR: 66 (25 Jun 2022 21:07) (66 - 66)  BP: 130/84 (25 Jun 2022 21:07) (130/84 - 130/84)  RR: 16 (25 Jun 2022 21:07) (16 - 16)  SpO2: 100% (25 Jun 2022 21:07) (100% - 100%)    GENERAL: NAD, lying in bed comfortably, AAOx3  HEAD:  Atraumatic, Normocephalic  EYES: EOMI, PERRLA, conjunctiva and sclera clear  ENT: Moist mucous membranes  NECK: Supple, No JVD  CHEST/LUNG: Clear to auscultation bilaterally; No rales, rhonchi, wheezing, or rubs. Unlabored respirations  HEART: Regular rate and rhythm; No murmurs, rubs, or gallops  ABDOMEN: Mild epigastric pain is improved   EXTREMITIES:  2+ Peripheral Pulses, brisk capillary refill. No clubbing, cyanosis, or edema  NERVOUS SYSTEM:  Alert & Oriented X3, speech clear. No deficits   MSK: FROM all 4 extremities, full and equal strength  SKIN: No rashes or lesions

## 2022-06-26 NOTE — H&P ADULT - PROBLEM SELECTOR PLAN 3
- Will continue on Toprol Xl for now   - Hold Losartan given MARICRUZ   - Will restart if MARICRUZ improved   - DASH diet

## 2022-06-26 NOTE — H&P ADULT - ATTENDING COMMENTS
Patient is 88 year old male with past medical history of Lymphoma, diabetes mellitus and hypertension, chronic Vertigo p/w c/o with chief complaint of abdominal pain, dizziness, weakness, and unable to tolerate PO for the last 2 days after getting MRI brain. Last night, Patient came to ED with vomiting, dizziness, and abdominal pain. CT abd was negative and was discharged with po meclizine as felt better. Today patient spent most of his time in bed and only was able to take a few bites, but was not able keep anything down.   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    In ED patient was nauseous, admitted for po intolerance.    Assessment and plan: Patient is 88 year old male with past medical history of Lymphoma, diabetes mellitus and hypertension, chronic Vertigo p/w c/o with chief complaint of abdominal pain, dizziness, weakness, and unable to tolerate PO.     Abdominal pain  unable to tolerate PO- now resolved  MARICRUZ vs. 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 will need outpatient follow with heme/onc.   - visited ED a day prior for same presentation, CT abd was neg> was d/c home, now presenting with similar presentation.  - patient reports having chronic vertigo x 3 years.  - now reports improvement in symptoms.  - symptomatic care prn zofran, iv fluids.   - advance diet as tolerated  - BUN creat 20/1.38 ( baseline not available), send urine lytes, monitor bmp  - Hold Lisinopril for now, resume BB.  - SSI

## 2022-06-26 NOTE — H&P ADULT - HISTORY OF PRESENT ILLNESS
Patient is 88 year old male with past medical history of diabetes mellitus and hypertension, Vertigo is presenting to the ED with chief complaint of abdominal pain, generalized dizziness, weakness, and unable to tolerate PO for the last 2 days. Last night, Patient came to ED with vomiting, dizziness, and abdominal pain. He was discharged yesterday when he felt better and felt well enough to go home. Today patient spent most of his time in bed and only was able to take a few bites, which he still vomited back out, However, Patient feels much better in ED by the time of examination and was asking if he can go home. Patient denied any chest pain, change in bowel movement.

## 2022-06-26 NOTE — H&P ADULT - PROBLEM SELECTOR PLAN 1
- Patient was complaining of abdominal pain, N/V and inability to tolerate PO intake   - CT abdomen and Pelvis IV contrast didn't' show any abnormalities   - Patient status improved; Will start on DASH diet   - Zofran IV PRN for N/V   - Tylenol and Oxycodone PRN for pain

## 2022-06-26 NOTE — PATIENT PROFILE ADULT - FALL HARM RISK - HARM RISK INTERVENTIONS
Assistance with ambulation/Assistance OOB with selected safe patient handling equipment/Communicate Risk of Fall with Harm to all staff/Discuss with provider need for PT consult/Monitor gait and stability/Provide patient with walking aids - walker, cane, crutches/Reinforce activity limits and safety measures with patient and family/Tailored Fall Risk Interventions/Use of alarms - bed, chair and/or voice tab/Visual Cue: Yellow wristband and red socks/Bed in lowest position, wheels locked, appropriate side rails in place/Call bell, personal items and telephone in reach/Instruct patient to call for assistance before getting out of bed or chair/Non-slip footwear when patient is out of bed/[place] to call system/Physically safe environment - no spills, clutter or unnecessary equipment/Purposeful Proactive Rounding/Room/bathroom lighting operational, light cord in reach

## 2022-06-27 DIAGNOSIS — R90.89 OTHER ABNORMAL FINDINGS ON DIAGNOSTIC IMAGING OF CENTRAL NERVOUS SYSTEM: ICD-10-CM

## 2022-06-27 LAB
ALBUMIN SERPL ELPH-MCNC: 3.1 G/DL — LOW (ref 3.5–5)
ALP SERPL-CCNC: 38 U/L — LOW (ref 40–120)
ALT FLD-CCNC: 16 U/L DA — SIGNIFICANT CHANGE UP (ref 10–60)
ANION GAP SERPL CALC-SCNC: 8 MMOL/L — SIGNIFICANT CHANGE UP (ref 5–17)
AST SERPL-CCNC: 17 U/L — SIGNIFICANT CHANGE UP (ref 10–40)
BASOPHILS # BLD AUTO: 0.06 K/UL — SIGNIFICANT CHANGE UP (ref 0–0.2)
BASOPHILS NFR BLD AUTO: 0.7 % — SIGNIFICANT CHANGE UP (ref 0–2)
BILIRUB SERPL-MCNC: 1 MG/DL — SIGNIFICANT CHANGE UP (ref 0.2–1.2)
BUN SERPL-MCNC: 19 MG/DL — HIGH (ref 7–18)
CALCIUM SERPL-MCNC: 8.9 MG/DL — SIGNIFICANT CHANGE UP (ref 8.4–10.5)
CHLORIDE SERPL-SCNC: 102 MMOL/L — SIGNIFICANT CHANGE UP (ref 96–108)
CO2 SERPL-SCNC: 26 MMOL/L — SIGNIFICANT CHANGE UP (ref 22–31)
CREAT SERPL-MCNC: 1.22 MG/DL — SIGNIFICANT CHANGE UP (ref 0.5–1.3)
EGFR: 57 ML/MIN/1.73M2 — LOW
EOSINOPHIL # BLD AUTO: 0.18 K/UL — SIGNIFICANT CHANGE UP (ref 0–0.5)
EOSINOPHIL NFR BLD AUTO: 2 % — SIGNIFICANT CHANGE UP (ref 0–6)
GLUCOSE BLDC GLUCOMTR-MCNC: 111 MG/DL — HIGH (ref 70–99)
GLUCOSE BLDC GLUCOMTR-MCNC: 115 MG/DL — HIGH (ref 70–99)
GLUCOSE BLDC GLUCOMTR-MCNC: 132 MG/DL — HIGH (ref 70–99)
GLUCOSE BLDC GLUCOMTR-MCNC: 203 MG/DL — HIGH (ref 70–99)
GLUCOSE SERPL-MCNC: 105 MG/DL — HIGH (ref 70–99)
HCT VFR BLD CALC: 35.9 % — LOW (ref 39–50)
HGB BLD-MCNC: 11.6 G/DL — LOW (ref 13–17)
IMM GRANULOCYTES NFR BLD AUTO: 0.3 % — SIGNIFICANT CHANGE UP (ref 0–1.5)
LYMPHOCYTES # BLD AUTO: 2.25 K/UL — SIGNIFICANT CHANGE UP (ref 1–3.3)
LYMPHOCYTES # BLD AUTO: 25.1 % — SIGNIFICANT CHANGE UP (ref 13–44)
MAGNESIUM SERPL-MCNC: 2.1 MG/DL — SIGNIFICANT CHANGE UP (ref 1.6–2.6)
MCHC RBC-ENTMCNC: 29.3 PG — SIGNIFICANT CHANGE UP (ref 27–34)
MCHC RBC-ENTMCNC: 32.3 GM/DL — SIGNIFICANT CHANGE UP (ref 32–36)
MCV RBC AUTO: 90.7 FL — SIGNIFICANT CHANGE UP (ref 80–100)
MONOCYTES # BLD AUTO: 1.15 K/UL — HIGH (ref 0–0.9)
MONOCYTES NFR BLD AUTO: 12.8 % — SIGNIFICANT CHANGE UP (ref 2–14)
NEUTROPHILS # BLD AUTO: 5.31 K/UL — SIGNIFICANT CHANGE UP (ref 1.8–7.4)
NEUTROPHILS NFR BLD AUTO: 59.1 % — SIGNIFICANT CHANGE UP (ref 43–77)
NRBC # BLD: 0 /100 WBCS — SIGNIFICANT CHANGE UP (ref 0–0)
OSMOLALITY UR: 378 MOS/KG — SIGNIFICANT CHANGE UP (ref 50–1200)
PHOSPHATE SERPL-MCNC: 2.5 MG/DL — SIGNIFICANT CHANGE UP (ref 2.5–4.5)
PLATELET # BLD AUTO: 199 K/UL — SIGNIFICANT CHANGE UP (ref 150–400)
POTASSIUM SERPL-MCNC: 4.3 MMOL/L — SIGNIFICANT CHANGE UP (ref 3.5–5.3)
POTASSIUM SERPL-SCNC: 4.3 MMOL/L — SIGNIFICANT CHANGE UP (ref 3.5–5.3)
PROT SERPL-MCNC: 6.7 G/DL — SIGNIFICANT CHANGE UP (ref 6–8.3)
RBC # BLD: 3.96 M/UL — LOW (ref 4.2–5.8)
RBC # FLD: 14.5 % — SIGNIFICANT CHANGE UP (ref 10.3–14.5)
SODIUM SERPL-SCNC: 136 MMOL/L — SIGNIFICANT CHANGE UP (ref 135–145)
SODIUM UR-SCNC: 56 MMOL/L — SIGNIFICANT CHANGE UP
WBC # BLD: 8.98 K/UL — SIGNIFICANT CHANGE UP (ref 3.8–10.5)
WBC # FLD AUTO: 8.98 K/UL — SIGNIFICANT CHANGE UP (ref 3.8–10.5)

## 2022-06-27 PROCEDURE — 99356: CPT

## 2022-06-27 PROCEDURE — 99233 SBSQ HOSP IP/OBS HIGH 50: CPT | Mod: FS

## 2022-06-27 PROCEDURE — 99223 1ST HOSP IP/OBS HIGH 75: CPT

## 2022-06-27 RX ADMIN — CARVEDILOL PHOSPHATE 12.5 MILLIGRAM(S): 80 CAPSULE, EXTENDED RELEASE ORAL at 17:39

## 2022-06-27 RX ADMIN — ATORVASTATIN CALCIUM 40 MILLIGRAM(S): 80 TABLET, FILM COATED ORAL at 22:13

## 2022-06-27 RX ADMIN — HEPARIN SODIUM 5000 UNIT(S): 5000 INJECTION INTRAVENOUS; SUBCUTANEOUS at 06:20

## 2022-06-27 RX ADMIN — BIMATOPROST 1 DROP(S): 0.3 SOLUTION/ DROPS OPHTHALMIC at 22:13

## 2022-06-27 RX ADMIN — ONDANSETRON 4 MILLIGRAM(S): 8 TABLET, FILM COATED ORAL at 04:07

## 2022-06-27 RX ADMIN — HEPARIN SODIUM 5000 UNIT(S): 5000 INJECTION INTRAVENOUS; SUBCUTANEOUS at 17:34

## 2022-06-27 RX ADMIN — Medication 81 MILLIGRAM(S): at 11:43

## 2022-06-27 RX ADMIN — CARVEDILOL PHOSPHATE 12.5 MILLIGRAM(S): 80 CAPSULE, EXTENDED RELEASE ORAL at 06:21

## 2022-06-27 RX ADMIN — SODIUM CHLORIDE 80 MILLILITER(S): 9 INJECTION INTRAMUSCULAR; INTRAVENOUS; SUBCUTANEOUS at 06:19

## 2022-06-27 NOTE — CONSULT NOTE ADULT - SUBJECTIVE AND OBJECTIVE BOX
Reason for Admission: Abdominal Pain  History of Present Illness:   Patient is 88 year old male with past medical history of diabetes mellitus and hypertension, Vertigo is presenting to the ED with chief complaint of abdominal pain, generalized dizziness, weakness, and unable to tolerate PO for the last 2 days. Last night, Patient came to ED with vomiting, dizziness, and abdominal pain. He was discharged yesterday when he felt better and felt well enough to go home. Today patient spent most of his time in bed and only was able to take a few bites, which he still vomited back out, However, Patient feels much better in ED by the time of examination and was asking if he can go home. Patient denied any chest pain, change in bowel movement.    REVIEW OF SYSTEMS:    CONSTITUTIONAL: No fever, no loss of appetite. no chills, 5lb weight loss but d/t diet, no night sweats  EYES: chronic right eye blurry vision (s/p cataract sx with little improvement) slightly worsened  NECK: No pain or stiffness  RESPIRATORY: No cough; No shortness of breath  CARDIOVASCULAR: No chest pain, no palpitations  GASTROINTESTINAL: No pain. No nausea or vomiting; No diarrhea   NEUROLOGICAL: + dizziness (not spinning) x 1 week, No headache or numbness, no tremors  MUSCULOSKELETAL: No joint pain, no muscle pain  GENITOURINARY: no dysuria, no frequency, no hesitancy  PSYCHIATRY: no depression, no anxiety  ALL OTHER  ROS negative      Allergies and Intolerances:        Allergies:  	No Known Allergies:     Home Medications:   * Outpatient Medication Status not yet specified    Patient History:    Past Medical, Past Surgical, and Family History:  PAST MEDICAL HISTORY:  DM (diabetes mellitus)     High blood pressure.     Social History:  Social History (marital status, living situation, occupation, tobacco use, alcohol and drug use, and sexual history): Patient is from home, non smoker or alcohol hx     Tobacco Screening:  · Core Measure Site	Yes  · Has the patient used tobacco in the past 30 days?	No    Risk Assessment:    Present on Admission:  Deep Venous Thrombosis	no  Pulmonary Embolus	no  Urinary Catheter	no  Central Venous Catheter/PICC Line	no  Surgical Site Incision	no  Pressure Ulcer(s)	no     Heart Failure:  Does this patient have a history of or has been diagnosed with heart failure? no.        MEDICATIONS  (STANDING):  aspirin  chewable 81 milliGRAM(s) Oral daily  atorvastatin 40 milliGRAM(s) Oral at bedtime  bimatoprost 0.01% Ophthalmic Solution 1 Drop(s) Both EYES at bedtime  carvedilol 12.5 milliGRAM(s) Oral every 12 hours  heparin   Injectable 5000 Unit(s) SubCutaneous every 12 hours  insulin lispro (ADMELOG) corrective regimen sliding scale   SubCutaneous three times a day before meals  Rhopressa 0.02% eye drops 1 Drop(s) 1 Drop(s) Both EYES at bedtime  sodium chloride 0.9%. 1000 milliLiter(s) (80 mL/Hr) IV Continuous <Continuous>    MEDICATIONS  (PRN):  acetaminophen     Tablet .. 650 milliGRAM(s) Oral every 6 hours PRN Mild Pain (1 - 3)  meclizine 25 milliGRAM(s) Oral three times a day PRN Dizziness  ondansetron Injectable 4 milliGRAM(s) IV Push every 8 hours PRN Nausea and/or Vomiting    PHYSICAL EXAM:  Vital Signs Last 24 Hrs  T(C): 36.3 (27 Jun 2022 12:30), Max: 36.7 (26 Jun 2022 17:47)  T(F): 97.3 (27 Jun 2022 12:30), Max: 98.1 (26 Jun 2022 17:47)  HR: 54 (27 Jun 2022 12:30) (54 - 99)  BP: 127/53 (27 Jun 2022 12:30) (127/53 - 153/76)  BP(mean): 81 (26 Jun 2022 17:47) (81 - 86)  RR: 18 (27 Jun 2022 12:30) (18 - 18)  SpO2: 98% (27 Jun 2022 12:30) (98% - 99%)    GEN: NAD; A and O x 3, 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)  LUNGS: CTA B/L  HEART: S1 S2  ABDOMEN: soft, non-tender, non-distended, + BS  EXTREMITIES: no edema  NERVOUS SYSTEM:  Awake and alert; no focal neuro deficits    LABS:                        11.6   8.98  )-----------( 199      ( 27 Jun 2022 06:28 )             35.9     06-27    136  |  102  |  19<H>  ----------------------------<  105<H>  4.3   |  26  |  1.22    Ca    8.9      27 Jun 2022 06:28  Phos  2.5     06-27  Mg     2.1     06-27    TPro  6.7  /  Alb  3.1<L>  /  TBili  1.0  /  DBili  x   /  AST  17  /  ALT  16  /  AlkPhos  38<L>  06-27      CARDIAC MARKERS ( 25 Jun 2022 22:28 )  x     / x     / 106 U/L / x     / x              Culture - Urine (collected 24 Jun 2022 21:12)  Source: Clean Catch Clean Catch (Midstream)  Final Report (26 Jun 2022 10:13):    <10,000 CFU/mL Normal Urogenital Rafia      COVID-19 PCR: NotDetec (25 Jun 2022 22:28)  COVID-19 PCR: NotDetec (24 Jun 2022 17:54)      Radiology:  outside brain MRI 6/24/22 shows linear and nodular enhancement of the ependymal lining of the ventricular system susp for malignancy. 4mm and 6mm nodular enhancements left lateral ventricle body and atrium. 5mm nodular focus Right hypothalamus

## 2022-06-27 NOTE — PROGRESS NOTE ADULT - PROBLEM SELECTOR PLAN 1
Pt. reports increased weakness, dizziness and unsteady gait  -OP brain MRI abnormal as reported by pt.'s PCP Dr. Em  -Neuro Dr. Birmingham consulted  -Hem/onc [facility] consulted  -Neurosurgery Dr. Clayton consulted-no surgical indication  -Will likely need LP with flow cytometry

## 2022-06-27 NOTE — PROGRESS NOTE ADULT - PROBLEM SELECTOR PLAN 2
- Patient was complaining of abdominal pain, N/V and inability to tolerate PO intake-Improved  - CT abdomen and Pelvis IV contrast didn't' show any abnormalities   - Noted with no difficulty eating and with fair appetite  - Zofran IV PRN for N/V   - Tylenol and Oxycodone PRN for pain

## 2022-06-27 NOTE — PHYSICAL THERAPY INITIAL EVALUATION ADULT - GAIT DISTANCE, PT EVAL
[place] w/ straight cane, 20ft to then from toilet) and then Modified [place]/RW (Rolling Walker) for an additional 60ft. Mild but grossly stable dizziness.

## 2022-06-27 NOTE — PROGRESS NOTE ADULT - NS ATTEND AMEND GEN_ALL_CORE FT
88 year old male with past medical history of Lymphoma, diabetes mellitus and hypertension, chronic Vertigo p/w c/o with chief complaint of abdominal pain, dizziness, weakness, and unable to tolerate PO for the last 2 days after getting MRI brain.   Patient also with new dizziness different from chronic vertigo from the past two weeks, describes it problem with balance while walking/   Patient now tolerating PO intake well and n/v resolved. PT evaluated patient saying home with PT    Results from the recent MRI brain done outpatient with concern for leptominingeal spread of know CNS lymphoma in different areas and b/l semicircular canal dehiscence.    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 CKD   #Vertigo x 3 years, chronic  #Lymphoma  #Type 2 DM  #HTN  - neurosurgery consulted Dr. Clayton, able to review images and recommended patient to get LP and oncology consult  - neurology Dr. Birmingham consulted for LP with flow cytometry  - oncology consult with [facility]  - recommeding CT chest to r/o LAD  - patient now tolerating PO intake well,   - BUN creat 20/1.38 ( baseline not available), send urine lytes, monitor bmp  - Hold Lisinopril for now, resume BB.  - SSI   - plan discussed extensively with patient, NP and patient's nephrologist Dr. Mahmood and Dr. Em  - PT recommending home with PT

## 2022-06-27 NOTE — CONSULT NOTE ADULT - SUBJECTIVE AND OBJECTIVE BOX
NEUROLOGY CONSULT NOTE    NAME:  SHABBIR YEAGER      ASSESSMENT:  88 RHM with balance difficulty and mild cognitive impairment with memory loss      RECOMMENDATIONS:    - I have had extensive discussion with the patient about having a lumbar puncture to test CSF for Cytology and Flow Cytometry, which can guide appropriate treatment. I have reviewed the benefits and risks of this procedure. The patient has not given consent for this procedure, citing concerns over discomfort and risks.    - Primary team is asked to discuss lumbar puncture with the patient again tomorrow and to obtain consent after he has considered it and discussed it with his family. If he declines to have this procedure done urgently, it may deferred to the outpatient setting.    - Continue PT and walker to assist with ambulation    - DVT ppx: SCDs, Heparin        NOTE TO BE COMPLETED - PLEASE REFER TO ABOVE ONLY AND IGNORE INFORMATION BELOW    *******************************      CHIEF COMPLAINT:  Patient is a 88y old  Male who presents with a chief complaint of Abdominal Pain (27 Jun 2022 16:18)      HPI:  Patient is 88 year old male with past medical history of diabetes mellitus and hypertension, Vertigo is presenting to the ED with chief complaint of abdominal pain, generalized dizziness, weakness, and unable to tolerate PO for the last 2 days. Last night, Patient came to ED with vomiting, dizziness, and abdominal pain. He was discharged yesterday when he felt better and felt well enough to go home. Today patient spent most of his time in bed and only was able to take a few bites, which he still vomited back out, However, Patient feels much better in ED by the time of examination and was asking if he can go home. Patient denied any chest pain, change in bowel movement. (26 Jun 2022 02:11)      NEURO HPI:      PAST MEDICAL & SURGICAL HISTORY:  DM (diabetes mellitus)      High blood pressure          MEDICATIONS:  acetaminophen     Tablet .. 650 milliGRAM(s) Oral every 6 hours PRN  aspirin  chewable 81 milliGRAM(s) Oral daily  atorvastatin 40 milliGRAM(s) Oral at bedtime  bimatoprost 0.01% Ophthalmic Solution 1 Drop(s) Both EYES at bedtime  carvedilol 12.5 milliGRAM(s) Oral every 12 hours  heparin   Injectable 5000 Unit(s) SubCutaneous every 12 hours  insulin lispro (ADMELOG) corrective regimen sliding scale   SubCutaneous three times a day before meals  meclizine 25 milliGRAM(s) Oral three times a day PRN  ondansetron Injectable 4 milliGRAM(s) IV Push every 8 hours PRN  Rhopressa 0.02% eye drops 1 Drop(s) 1 Drop(s) Both EYES at bedtime  sodium chloride 0.9%. 1000 milliLiter(s) IV Continuous <Continuous>      ALLERGIES:  No Known Allergies      FAMILY HISTORY:        SOCIAL HISTORY:  Denies alcohol, tobacco, or illicit drug use    REVIEW OF SYSTEMS:  GENERAL: No fever, weight changes, fatigue  EYES: No eye pain or discharge  EAR/NOSE/MOUTH/THROAT: No sinus or throat pain; No difficulty hearing  NECK: No pain or stiffness  RESPIRATORY: No cough, wheezing, chills, or hemoptysis  CARDIOVASCULAR: No chest pain, palpitations, shortness of breath, or dyspnea on exertion  GASTROINTESTINAL: No abdominal pain, nausea, vomiting, hematemesis, diarrhea, or constipation  GENITOURINARY: No dysuria, frequency, hematuria, or incontinence  SKIN: No rashes or lesions  ENDOCRINE: No heat or cold intolerance  HEMATOLOGIC: No easy bruising or bleeding  PSYCHIATRIC: No depression, anxiety, or mood swings  MUSCULOSKELETAL: No joint pain or swelling  NEUROLOGICAL: As per HPI        OBJECTIVE:    Vital Signs Last 24 Hrs  T(C): 36.3 (27 Jun 2022 12:30), Max: 36.6 (27 Jun 2022 05:35)  T(F): 97.3 (27 Jun 2022 12:30), Max: 97.8 (27 Jun 2022 05:35)  HR: 58 (27 Jun 2022 17:39) (54 - 99)  BP: 139/65 (27 Jun 2022 17:39) (127/53 - 146/64)  BP(mean): --  RR: 18 (27 Jun 2022 12:30) (18 - 18)  SpO2: 98% (27 Jun 2022 12:30) (98% - 99%)    General Examination:  General: No acute distress  HEENT: Atraumatic, Normocephalic  Respiratory: CTA B/l.  No crackles, rhonchi, or wheezes.  Cardiovascular: RRR.  Normal S1 & S2.  Normal b/l radial and pedal pulses.    Neurological Examination:  General / Mental Status: AAO x 3.  No aphasia or dysarthria.  Naming and repetition intact.  Cranial Nerves: VFF x 4.  PERRL.  EOMI x 2, No nystagmus or diplopia.  B/l V1-V3 equal and intact to light touch and pinprick.  Symmetric facial movement and palate elevation.  B/l hearing equal to finger rub.  5/5 strength with b/l sternocleidomastoid & trapezius.  Midline tongue protrusion, with no atrophy or fasciculations.  Motor: Normal bulk & tone in all four extremities.  5/5 strength throughout all four extremities.  No downward drift, rigidity, spasticity, or tremors in any of the four extremities.  Sensory: Intact to light touch and pinprick in all four extremities.  Negative Romberg.  Reflex: 2+ and symmetric at b/l biceps, triceps, brachioradialis, patellae, and ankles.  Downgoing toes b/l.  Coordination: No dysmetria with b/l finger-to-nose and heel raise tests.  Symmetric rapid alternating movements b/l.  Gait: Normal, narrow-based gait.  No difficulty with tiptoe, heel, and tandem gaits.        LABORATORY VALUES:                        11.6   8.98  )-----------( 199      ( 27 Jun 2022 06:28 )             35.9       06-27    136  |  102  |  19<H>  ----------------------------<  105<H>  4.3   |  26  |  1.22    Ca    8.9      27 Jun 2022 06:28  Phos  2.5     06-27  Mg     2.1     06-27    TPro  6.7  /  Alb  3.1<L>  /  TBili  1.0  /  DBili  x   /  AST  17  /  ALT  16  /  AlkPhos  38<L>  06-27                  NEUROIMAGING:          Please contact the Neurology consult service with any neurological questions.    Austin Birmingham MD   of Neurology  [facility] at [facility] NEUROLOGY CONSULT NOTE    NAME:  SHABBIR YEAGER      ASSESSMENT:  88 RHM with balance difficulty and mild cognitive impairment with memory loss      RECOMMENDATIONS:    - I have had extensive discussion with the patient about having a lumbar puncture to test CSF for Cytology and Flow Cytometry, which can guide appropriate treatment. I have reviewed the benefits and risks of this procedure. The patient has not given consent for this procedure, citing concerns over discomfort and risks.    - Primary team is asked to discuss lumbar puncture with the patient again tomorrow and to obtain consent after he has considered it and discussed it with his family. If he declines to have this procedure done urgently, it may deferred to the outpatient setting.    - Continue PT and walker to assist with ambulation    - DVT ppx: SCDs, Heparin        *******************************      CHIEF COMPLAINT:  Patient is a 88y old  Male who presents with a chief complaint of Abdominal Pain (27 Jun 2022 16:18)      HPI:  Patient is 88 year old male with past medical history of diabetes mellitus and hypertension, Vertigo is presenting to the ED with chief complaint of abdominal pain, generalized dizziness, weakness, and unable to tolerate PO for the last 2 days. Last night, Patient came to ED with vomiting, dizziness, and abdominal pain. He was discharged yesterday when he felt better and felt well enough to go home. Today patient spent most of his time in bed and only was able to take a few bites, which he still vomited back out, However, Patient feels much better in ED by the time of examination and was asking if he can go home. Patient denied any chest pain, change in bowel movement. 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 A recent MRI Brain has revealed       PAST MEDICAL & SURGICAL HISTORY:  DM (diabetes mellitus)  Hypertension      MEDICATIONS:  acetaminophen     Tablet .. 650 milliGRAM(s) Oral every 6 hours PRN  aspirin  chewable 81 milliGRAM(s) Oral daily  atorvastatin 40 milliGRAM(s) Oral at bedtime  bimatoprost 0.01% Ophthalmic Solution 1 Drop(s) Both EYES at bedtime  carvedilol 12.5 milliGRAM(s) Oral every 12 hours  heparin   Injectable 5000 Unit(s) SubCutaneous every 12 hours  insulin lispro (ADMELOG) corrective regimen sliding scale   SubCutaneous three times a day before meals  meclizine 25 milliGRAM(s) Oral three times a day PRN  ondansetron Injectable 4 milliGRAM(s) IV Push every 8 hours PRN  Rhopressa 0.02% eye drops 1 Drop(s) 1 Drop(s) Both EYES at bedtime  sodium chloride 0.9%. 1000 milliLiter(s) IV Continuous <Continuous>      ALLERGIES:  No Known Allergies      FAMILY HISTORY:        SOCIAL HISTORY:  Denies alcohol, tobacco, or illicit drug use    REVIEW OF SYSTEMS:  GENERAL: No fever, weight changes, fatigue  EYES: No eye pain or discharge  EAR/NOSE/MOUTH/THROAT: No sinus or throat pain; No difficulty hearing  NECK: No pain or stiffness  RESPIRATORY: No cough, wheezing, chills, or hemoptysis  CARDIOVASCULAR: No chest pain, palpitations, shortness of breath, or dyspnea on exertion  GASTROINTESTINAL: No abdominal pain, nausea, vomiting, hematemesis, diarrhea, or constipation  GENITOURINARY: No dysuria, frequency, hematuria, or incontinence  SKIN: No rashes or lesions  ENDOCRINE: No heat or cold intolerance  HEMATOLOGIC: No easy bruising or bleeding  PSYCHIATRIC: No depression, anxiety, or mood swings  MUSCULOSKELETAL: No joint pain or swelling  NEUROLOGICAL: As per HPI        OBJECTIVE:    Vital Signs Last 24 Hrs  T(C): 36.3 (27 Jun 2022 12:30), Max: 36.6 (27 Jun 2022 05:35)  T(F): 97.3 (27 Jun 2022 12:30), Max: 97.8 (27 Jun 2022 05:35)  HR: 58 (27 Jun 2022 17:39) (54 - 99)  BP: 139/65 (27 Jun 2022 17:39) (127/53 - 146/64)  BP(mean): --  RR: 18 (27 Jun 2022 12:30) (18 - 18)  SpO2: 98% (27 Jun 2022 12:30) (98% - 99%)    General Examination:  General: No acute distress  HEENT: Atraumatic, Normocephalic  Respiratory: CTA B/l.  No crackles, rhonchi, or wheezes.  Cardiovascular: RRR.  Normal S1 & S2.  Normal b/l radial and pedal pulses.    Neurological Examination:  General / Mental Status: AAO x 3.  No aphasia or dysarthria.  Naming and repetition intact.  Cranial Nerves: VFF x 4.  PERRL.  EOMI x 2, No nystagmus or diplopia.  B/l V1-V3 equal and intact to light touch and pinprick.  Symmetric facial movement and palate elevation.  B/l hearing equal to finger rub.  5/5 strength with b/l sternocleidomastoid & trapezius.  Midline tongue protrusion, with no atrophy or fasciculations.  Motor: Normal bulk & tone in all four extremities.  5/5 strength throughout all four extremities.  No downward drift, rigidity, spasticity, or tremors in any of the four extremities.  Sensory: Intact to light touch and pinprick in all four extremities.  Negative Romberg.  Reflex: 2+ and symmetric at b/l biceps, triceps, brachioradialis, patellae, and ankles.  Downgoing toes b/l.  Coordination: No dysmetria with b/l finger-to-nose and heel raise tests.  Symmetric rapid alternating movements b/l.  Gait: Normal, narrow-based gait.  No difficulty with tiptoe, heel, and tandem gaits.        LABORATORY VALUES:                        11.6   8.98  )-----------( 199      ( 27 Jun 2022 06:28 )             35.9       06-27    136  |  102  |  19<H>  ----------------------------<  105<H>  4.3   |  26  |  1.22    Ca    8.9      27 Jun 2022 06:28  Phos  2.5     06-27  Mg     2.1     06-27    TPro  6.7  /  Alb  3.1<L>  /  TBili  1.0  /  DBili  x   /  AST  17  /  ALT  16  /  AlkPhos  38<L>  06-27        NEUROIMAGING:                Please contact the Neurology consult service with any neurological questions.    Austin Birmingham MD   of Neurology  [facility] at [facility] NEUROLOGY CONSULT NOTE    NAME:  SHABBIR YEAGER      ASSESSMENT:  88 RHM with balance difficulty and mild cognitive impairment with memory loss      RECOMMENDATIONS:    - I have had extensive discussion with the patient about having a lumbar puncture to test CSF for Cytology and Flow Cytometry, which can guide appropriate treatment. I have reviewed the benefits and risks of this procedure. The patient has not given consent for this procedure, citing concerns over discomfort and risks.    - Primary team is asked to discuss lumbar puncture with the patient again tomorrow and to obtain consent after he has considered it and discussed it with his family. If he declines to have this procedure done urgently, it may deferred to the outpatient setting.    - Continue PT and walker to assist with ambulation    - DVT ppx: SCDs, Heparin        *******************************      CHIEF COMPLAINT:  Patient is a 88y old  Male who presents with a chief complaint of Abdominal Pain (27 Jun 2022 16:18)      HPI:  Patient is 88 year old male with past medical history of diabetes mellitus and hypertension, Vertigo is presenting to the ED with chief complaint of abdominal pain, generalized dizziness, weakness, and unable to tolerate PO for the last 2 days. Last night, Patient came to ED with vomiting, dizziness, and abdominal pain. He was discharged yesterday when he felt better and felt well enough to go home. Today patient spent most of his time in bed and only was able to take a few bites, which he still vomited back out, However, Patient feels much better in ED by the time of examination and was asking if he can go home. Patient denied any chest pain, change in bowel movement. 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      PAST MEDICAL & SURGICAL HISTORY:  DM (diabetes mellitus)  Hypertension      MEDICATIONS:  acetaminophen     Tablet .. 650 milliGRAM(s) Oral every 6 hours PRN  aspirin  chewable 81 milliGRAM(s) Oral daily  atorvastatin 40 milliGRAM(s) Oral at bedtime  bimatoprost 0.01% Ophthalmic Solution 1 Drop(s) Both EYES at bedtime  carvedilol 12.5 milliGRAM(s) Oral every 12 hours  heparin   Injectable 5000 Unit(s) SubCutaneous every 12 hours  insulin lispro (ADMELOG) corrective regimen sliding scale   SubCutaneous three times a day before meals  meclizine 25 milliGRAM(s) Oral three times a day PRN  ondansetron Injectable 4 milliGRAM(s) IV Push every 8 hours PRN  Rhopressa 0.02% eye drops 1 Drop(s) 1 Drop(s) Both EYES at bedtime  sodium chloride 0.9%. 1000 milliLiter(s) IV Continuous <Continuous>      ALLERGIES:  No Known Allergies      FAMILY HISTORY:  No reported family history of lymphoma      SOCIAL HISTORY:  Denies alcohol, tobacco, or illicit drug use      REVIEW OF SYSTEMS:  GENERAL: No fever, weight changes, fatigue  EYES: No eye pain or discharge  EAR/NOSE/MOUTH/THROAT: No sinus or throat pain; No difficulty hearing  NECK: No pain or stiffness  RESPIRATORY: No cough, wheezing, chills, or hemoptysis  CARDIOVASCULAR: No chest pain, palpitations, shortness of breath, or dyspnea on exertion  GASTROINTESTINAL: Recent abdominal pain, nausea, and vomiting; No hematemesis, diarrhea, or constipation  GENITOURINARY: No dysuria, frequency, hematuria, or incontinence  SKIN: No rashes or lesions  ENDOCRINE: No heat or cold intolerance  HEMATOLOGIC: H/o lymphoma s/p splenectomy  PSYCHIATRIC: No depression, anxiety, or mood swings  MUSCULOSKELETAL: No joint pain or swelling  NEUROLOGICAL: As per HPI        OBJECTIVE:    Vital Signs Last 24 Hrs  T(C): 36.3 (27 Jun 2022 12:30), Max: 36.6 (27 Jun 2022 05:35)  T(F): 97.3 (27 Jun 2022 12:30), Max: 97.8 (27 Jun 2022 05:35)  HR: 58 (27 Jun 2022 17:39) (54 - 99)  BP: 139/65 (27 Jun 2022 17:39) (127/53 - 146/64)  RR: 18 (27 Jun 2022 12:30) (18 - 18)  SpO2: 98% (27 Jun 2022 12:30) (98% - 99%)    General Examination:  General: No acute distress  HEENT: Atraumatic, Normocephalic  Respiratory: CTA B/l.  No crackles, rhonchi, or wheezes.  Cardiovascular: Low heart rate, Regular rhythm.  Normal S1 & S2.  Normal b/l radial and pedal pulses.    Neurological Examination:  General / Mental Status: AAO x 3.  No aphasia or dysarthria.  Immediate recall: 3/3, 5-minute delayed recall: 2/3 (recalls third word with category cue).  Cranial Nerves: VFF x 4.  PERRL.  EOMI x 2, No nystagmus or diplopia.  B/l V1-V3 equal and intact to light touch and pinprick.  Symmetric facial movement and palate elevation.  B/l hearing equal to finger rub.  5/5 strength with b/l sternocleidomastoid & trapezius.  Midline tongue protrusion, with no atrophy or fasciculations.  Motor: Normal bulk & tone in all four extremities.  5/5 strength throughout b/l upper extremities, with b/l hand  strength equal.  At least 4/5 strength throughout b/l lower extremities.  No downward drift, rigidity, spasticity, or tremors in any of the four extremities.  Sensory: Intact to light touch and pinprick in all four extremities.  Reflex: 1+ and symmetric at b/l biceps, triceps, brachioradialis, patellae, and ankles.  Mute toes b/l.  Coordination: No dysmetria with b/l finger-to-nose and heel raise tests.  Gait/Romberg: Requires support to stand with slightly wide base.  Unable to participate in Romberg sign, gait, or specialized gait testing.        LABORATORY VALUES:                        11.6   8.98  )-----------( 199      ( 27 Jun 2022 06:28 )             35.9       06-27    136  |  102  |  19<H>  ----------------------------<  105<H>  4.3   |  26  |  1.22    Ca    8.9      27 Jun 2022 06:28  Phos  2.5     06-27  Mg     2.1     06-27    TPro  6.7  /  Alb  3.1<L>  /  TBili  1.0  /  DBili  x   /  AST  17  /  ALT  16  /  AlkPhos  38<L>  06-27        NEUROIMAGING:      MRI Brain w/wo Gadolinium (6/24/22): Per report,  - Incomplete thin linear enhancement along walls of lateral, third, and fourth ventricles  - Thick linear enhancement of b/l inferior third ventricle walls adjacent to hypothalami and along b/l anterolateral fourth ventricle walls  - 5 mm nodular focus of enhancement involving right hypothalamus and anterolateral wall of third ventricle  - Mild chronic microvascular changes  - Thinning and 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    Austin Birmingham MD   of Neurology  [facility] at [facility]

## 2022-06-27 NOTE — PROGRESS NOTE ADULT - SUBJECTIVE AND OBJECTIVE BOX
NP Note discussed with  Primary Attending    Patient is a 88y old  Male who presents with a chief complaint of Abdominal Pain (27 Jun 2022 15:51)      INTERVAL HPI/OVERNIGHT EVENTS: no new complaints    MEDICATIONS  (STANDING):  aspirin  chewable 81 milliGRAM(s) Oral daily  atorvastatin 40 milliGRAM(s) Oral at bedtime  bimatoprost 0.01% Ophthalmic Solution 1 Drop(s) Both EYES at bedtime  carvedilol 12.5 milliGRAM(s) Oral every 12 hours  heparin   Injectable 5000 Unit(s) SubCutaneous every 12 hours  insulin lispro (ADMELOG) corrective regimen sliding scale   SubCutaneous three times a day before meals  Rhopressa 0.02% eye drops 1 Drop(s) 1 Drop(s) Both EYES at bedtime  sodium chloride 0.9%. 1000 milliLiter(s) (80 mL/Hr) IV Continuous <Continuous>    MEDICATIONS  (PRN):  acetaminophen     Tablet .. 650 milliGRAM(s) Oral every 6 hours PRN Mild Pain (1 - 3)  meclizine 25 milliGRAM(s) Oral three times a day PRN Dizziness  ondansetron Injectable 4 milliGRAM(s) IV Push every 8 hours PRN Nausea and/or Vomiting      __________________________________________________  REVIEW OF SYSTEMS:    CONSTITUTIONAL: No fever,   EYES: no acute visual disturbances  NECK: No pain or stiffness  RESPIRATORY: No cough; No shortness of breath  CARDIOVASCULAR: No chest pain, no palpitations  GASTROINTESTINAL: No pain. No nausea or vomiting; No diarrhea   NEUROLOGICAL: No headache or numbness, no tremors  MUSCULOSKELETAL: No joint pain, no muscle pain  GENITOURINARY: no dysuria, no frequency, no hesitancy  PSYCHIATRY: no depression , no anxiety  ALL OTHER  ROS negative        Vital Signs Last 24 Hrs  T(C): 36.3 (27 Jun 2022 12:30), Max: 36.7 (26 Jun 2022 17:47)  T(F): 97.3 (27 Jun 2022 12:30), Max: 98.1 (26 Jun 2022 17:47)  HR: 54 (27 Jun 2022 12:30) (54 - 99)  BP: 127/53 (27 Jun 2022 12:30) (127/53 - 153/76)  BP(mean): 81 (26 Jun 2022 17:47) (81 - 81)  RR: 18 (27 Jun 2022 12:30) (18 - 18)  SpO2: 98% (27 Jun 2022 12:30) (98% - 99%)    ________________________________________________  PHYSICAL EXAM:  appropriate for age, well developed, well groomed  GENERAL: NAD  HEENT: Normocephalic;  conjunctivae and sclerae clear; moist mucous membranes;   NECK : supple  CHEST/LUNG: Clear to auscultation bilaterally with good air entry   HEART: S1 S2  regular; no murmurs, gallops or rubs  ABDOMEN: Soft, Nontender, Nondistended; Bowel sounds present  EXTREMITIES: no cyanosis; no edema; no calf tenderness  SKIN: warm and dry; no rash  NERVOUS SYSTEM:  Awake and alert; Oriented  to place, person and time ; no new deficits    _________________________________________________  LABS:                        11.6   8.98  )-----------( 199      ( 27 Jun 2022 06:28 )             35.9     06-27    136  |  102  |  19<H>  ----------------------------<  105<H>  4.3   |  26  |  1.22    Ca    8.9      27 Jun 2022 06:28  Phos  2.5     06-27  Mg     2.1     06-27    TPro  6.7  /  Alb  3.1<L>  /  TBili  1.0  /  DBili  x   /  AST  17  /  ALT  16  /  AlkPhos  38<L>  06-27        CAPILLARY BLOOD GLUCOSE      POCT Blood Glucose.: 132 mg/dL (27 Jun 2022 11:30)  POCT Blood Glucose.: 115 mg/dL (27 Jun 2022 07:37)  POCT Blood Glucose.: 115 mg/dL (26 Jun 2022 21:33)    RADIOLOGY & ADDITIONAL TESTS:    < from: CT Abdomen and Pelvis w/ IV Cont (06.24.22 @ 20:05) >    ACC: [id number] EXAM:  CT ABDOMEN AND PELVIS IC                          PROCEDURE DATE:  06/24/2022          INTERPRETATION:  CLINICAL INFORMATION: Constipation, vomiting.    COMPARISON: None.    CONTRAST/COMPLICATIONS:  IV Contrast: Omnipaque 350  90 cc administered   10 cc discarded  Oral Contrast: NONE  Complications: None reported at time of study completion    PROCEDURE:  CT of the Abdomen and Pelvis was performed.  Sagittal and coronal reformats were performed.    FINDINGS:  LOWER CHEST: Cardiomegaly. 3 mm subpleural nodule in the right middle   lobe.    LIVER: Within normal limits.  BILE DUCTS: Normal caliber.  GALLBLADDER: Cholelithiasis.  SPLEEN: Within normal limits.  PANCREAS: Within normal limits.  ADRENALS: 1.2 cm left adrenal nodule.  KIDNEYS/URETERS: Bilateral extrarenal pelvis. Multiple tiny   low-attenuation lesions bilaterally are indeterminate. 2 simple cysts in   the right kidney. A dominant low-attenuation lesion in the midpole of   left kidney measuring 2.9 cm (2, 32) and a 4 cm cyst in the right kidney   (2, 35) do not meet CT criteria for a simple cyst.    BLADDER: 1 cm stone in the left posterolateral urinary bladder.  REPRODUCTIVE ORGANS: Prostate is enlarged.    BOWEL: No bowel obstruction. Appendix is normal.  PERITONEUM: No ascites.  VESSELS: Atherosclerotic changes.  RETROPERITONEUM/LYMPH NODES: No lymphadenopathy.  ABDOMINAL WALL: Within normal limits.  BONES: Degenerative changes. Osteopenia.    IMPRESSION:  No acute abnormality in the abdomen and pelvis.  2 cystic lesions in the kidneys which may represent proteinaceous cysts.   However, ultrasound is advised for confirmation on outpatient basis.      --- End of Report ---    < end of copied text >        Imaging Personally Reviewed:  YES/NO    Consultant(s) Notes Reviewed:   YES/ No    Care Discussed with Consultants :     Plan of care was discussed with patient and /or primary care giver; all questions and concerns were addressed and care was aligned with patient's wishes.

## 2022-06-27 NOTE — PHYSICAL THERAPY INITIAL EVALUATION ADULT - IMPAIRMENTS FOUND, PT EVAL
Discharge instructions reviewed with patient, opportunity for questions given.   Rx given for Norco. gait, locomotion, and balance/muscle strength

## 2022-06-27 NOTE — PHYSICAL THERAPY INITIAL EVALUATION ADULT - ADDITIONAL COMMENTS
-- owns straight cane and shower bars  -- denies any fall in last 6 months  -- very active prior to recent episode nausea / vomiting and unable to eat.

## 2022-06-27 NOTE — PHYSICAL THERAPY INITIAL EVALUATION ADULT - GENERAL OBSERVATIONS, REHAB EVAL
Consult received, chart reviewed. Patient received supine in bed, NAD, Patient agreed to EVALUATION from Physical Therapist. Lt eyelid redness, RN made aware who advised team aware and drops were ordered

## 2022-06-27 NOTE — CONSULT NOTE ADULT - NS ATTEND AMEND GEN_ALL_CORE FT
I have examined the patient at bedside and reviewed patient's data and participated in the management of the patient along with Natali AYALA as well as hemotology/med oncology faculty consisting of Dr. Daniel Blue, Dr. WATSON Ann, Dr. SOLANGE Hartman, Dr. Geovanny Deshpande, Dr. Brooke Dudley, Dr. Syd Woodson as well as myself during the daily heme/onc case review. I reviewed pertinent clinical information, PE,  labs as well as A/P as outline above.    Await Neurology and LP to evaluate nodularity of ventricle system of the brain. Pt reluctant at this time.

## 2022-06-27 NOTE — PROGRESS NOTE ADULT - ASSESSMENT
Patient is 88 year old male with past medical history of diabetes mellitus and hypertension, Vertigo is presenting to the ED with chief complaint of abdominal pain, generalized dizziness, weakness, and unable to tolerate PO for the last 2 days. Last night, Patient came to ED with vomiting, dizziness, and abdominal pain. He was discharged yesterday when he felt better and felt well enough to go home. Today patient spent most of his time in bed and only was able to take a few bites, which he still vomited back out, However, Patient feels much better in ED by the time of examination and was asking if he can go home. CT abdomen and pelvis IV contrast didn't show any acute abnormalities. Patient will be admitted for further monitoring.  6/27-Pt. reports feeling better, ambulated with PT with slight associated dizziness, recommended home with home PT.  Spoke to Pt.'s PCP Dr. Santhosh Em [phone].  As per discussion, pt. with abnormal OP brain MRI, will likely require LP with flow cytometry.  Neuro Dr. Birmingham and neurosurgery Dr. Clayton consulted.  No neurosurgery indicated, will f/u neuro reccs.  Hem/Onc QMA consulted, will f/u reccs.    Pt. lives with spouse, does not currently have HHA.  Discharge planning home vs. transfer to another facility for further Lymphoma w/u.

## 2022-06-27 NOTE — CONSULT NOTE ADULT - ASSESSMENT
complete note to follow  88 year old male with past medical history of diabetes mellitus and hypertension, Vertigo is presenting to the ED with chief complaint of abdominal pain, generalized dizziness, weakness, and unable to tolerate PO for the last 2 days. Last night, Patient came to ED with vomiting, dizziness, and abdominal pain. He was discharged yesterday when he felt better and felt well enough to go home. Today patient spent most of his time in bed and only was able to take a few bites, which he still vomited back out, However, Patient feels much better in ED by the time of examination and was asking if he can go home. Patient denied any chest pain, change in bowel movement.    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 Mak at [facility]  Splenic infiltration of B-cell Lymphoma, s/p splenectomy  outside brain MRI 6/24/22: shows linear and nodular enhancement of the ependymal lining of the ventricular system susp for malignancy. 4mm and 6mm nodular enhancements left lateral ventricle body and atrium. 5mm nodular focus Right hypothalamus  CT A/P (-)  Rec's:  -Check LDH, ESR  -Call and speak with Dr. Mak for addt'l Hx and   -Neurology consult  -NeuroSx consult    Thank you for the referral. Will continue to monitor the patient.  Please call with any questions [phone]  Above reviewed with Attending Dr. Galvez  [facility] Hem/Onc  [address], [address], [place], [place]  [phone]  *Note not finalized until signed by Attending Physician         88 year old male with past medical history of diabetes mellitus and hypertension, Vertigo is presenting to the ED with chief complaint of abdominal pain, generalized dizziness, weakness, and unable to tolerate PO for the last 2 days. Last night, Patient came to ED with vomiting, dizziness, and abdominal pain. He was discharged yesterday when he felt better and felt well enough to go home. Today patient spent most of his time in bed and only was able to take a few bites, which he still vomited back out, However, Patient feels much better in ED by the time of examination and was asking if he can go home. Patient denied any chest pain, change in bowel movement.    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 Mak at [facility] [place]  Splenic infiltration of B-cell Lymphoma, s/p splenectomy  outside brain MRI 6/24/22: shows linear and nodular enhancement of the ependymal lining of the ventricular system susp for malignancy. 4mm and 6mm nodular enhancements left lateral ventricle body and atrium. 5mm nodular focus Right hypothalamus  CT A/P (-)  Rec's:  -Check LDH, ESR  -Chest CT  -Called to speak with Dr. Mak for addt'l Hx and rec's, no answer, will reach out again tomorrow  -Neurology consult, LP  -NeuroSx consult    Thank you for the referral. Will continue to monitor the patient.  Please call with any questions [phone]  Above reviewed with Attending Dr. Galvez  [facility] Hem/Onc  [address] [facility], [address], [place], [place]  [phone]  *Note not finalized until signed by Attending Physician         88 year old male with past medical history of diabetes mellitus and hypertension, Vertigo is presenting to the ED with chief complaint of abdominal pain, generalized dizziness, weakness, and unable to tolerate PO for the last 2 days. Last night, Patient came to ED with vomiting, dizziness, and abdominal pain. He was discharged yesterday when he felt better and felt well enough to go home. Today patient spent most of his time in bed and only was able to take a few bites, which he still vomited back out, However, Patient feels much better in ED by the time of examination and was asking if he can go home. Patient denied any chest pain, change in bowel movement.    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 4mm and 6mm nodular enhancements left lateral ventricle body and atrium. 5mm nodular focus Right hypothalamus  CT A/P (-)  Rec's:  -Check LDH, ESR  -Chest CT  -Called to speak with Dr. Mak for addt'l Hx and rec's, no answer, will reach out again tomorrow  -Neurology consult, LP  -NeuroSx consult  -further recommendations pending above    Thank you for the referral. Will continue to monitor the patient.  Please call with any questions [phone]  Above reviewed with Attending Dr. Galvez  [facility] Hem/Onc  [address] [facility], [address], [place], [place]  [phone]  *Note not finalized until signed by Attending Physician

## 2022-06-27 NOTE — CONSULT NOTE ADULT - TIME BILLING
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

## 2022-06-27 NOTE — PHYSICAL THERAPY INITIAL EVALUATION ADULT - DIAGNOSIS, PT EVAL
(ICF Model) Pt. present w/impairments in Body Structures/Function, incl: Strength, and Balance leading to deficits in performing the below noted Activities (Limitations).

## 2022-06-28 ENCOUNTER — TRANSCRIPTION ENCOUNTER (OUTPATIENT)
Age: 87
End: 2022-06-28

## 2022-06-28 DIAGNOSIS — Z02.9 ENCOUNTER FOR ADMINISTRATIVE EXAMINATIONS, UNSPECIFIED: ICD-10-CM

## 2022-06-28 LAB
ANION GAP SERPL CALC-SCNC: 7 MMOL/L — SIGNIFICANT CHANGE UP (ref 5–17)
BUN SERPL-MCNC: 15 MG/DL — SIGNIFICANT CHANGE UP (ref 7–18)
CALCIUM SERPL-MCNC: 8.9 MG/DL — SIGNIFICANT CHANGE UP (ref 8.4–10.5)
CHLORIDE SERPL-SCNC: 102 MMOL/L — SIGNIFICANT CHANGE UP (ref 96–108)
CO2 SERPL-SCNC: 25 MMOL/L — SIGNIFICANT CHANGE UP (ref 22–31)
CREAT SERPL-MCNC: 1.12 MG/DL — SIGNIFICANT CHANGE UP (ref 0.5–1.3)
EGFR: 63 ML/MIN/1.73M2 — SIGNIFICANT CHANGE UP
ERYTHROCYTE [SEDIMENTATION RATE] IN BLOOD: 6 MM/HR — SIGNIFICANT CHANGE UP (ref 0–20)
GLUCOSE BLDC GLUCOMTR-MCNC: 105 MG/DL — HIGH (ref 70–99)
GLUCOSE BLDC GLUCOMTR-MCNC: 144 MG/DL — HIGH (ref 70–99)
GLUCOSE BLDC GLUCOMTR-MCNC: 172 MG/DL — HIGH (ref 70–99)
GLUCOSE BLDC GLUCOMTR-MCNC: 196 MG/DL — HIGH (ref 70–99)
GLUCOSE SERPL-MCNC: 107 MG/DL — HIGH (ref 70–99)
HCT VFR BLD CALC: 33.4 % — LOW (ref 39–50)
HGB BLD-MCNC: 11.1 G/DL — LOW (ref 13–17)
LDH SERPL L TO P-CCNC: 166 U/L — SIGNIFICANT CHANGE UP (ref 120–225)
MCHC RBC-ENTMCNC: 29.8 PG — SIGNIFICANT CHANGE UP (ref 27–34)
MCHC RBC-ENTMCNC: 33.2 GM/DL — SIGNIFICANT CHANGE UP (ref 32–36)
MCV RBC AUTO: 89.8 FL — SIGNIFICANT CHANGE UP (ref 80–100)
NRBC # BLD: 0 /100 WBCS — SIGNIFICANT CHANGE UP (ref 0–0)
PLATELET # BLD AUTO: 188 K/UL — SIGNIFICANT CHANGE UP (ref 150–400)
POTASSIUM SERPL-MCNC: 3.8 MMOL/L — SIGNIFICANT CHANGE UP (ref 3.5–5.3)
POTASSIUM SERPL-SCNC: 3.8 MMOL/L — SIGNIFICANT CHANGE UP (ref 3.5–5.3)
RBC # BLD: 3.72 M/UL — LOW (ref 4.2–5.8)
RBC # FLD: 14.5 % — SIGNIFICANT CHANGE UP (ref 10.3–14.5)
SODIUM SERPL-SCNC: 134 MMOL/L — LOW (ref 135–145)
WBC # BLD: 8.73 K/UL — SIGNIFICANT CHANGE UP (ref 3.8–10.5)
WBC # FLD AUTO: 8.73 K/UL — SIGNIFICANT CHANGE UP (ref 3.8–10.5)

## 2022-06-28 PROCEDURE — 71250 CT THORAX DX C-: CPT | Mod: 26

## 2022-06-28 PROCEDURE — 99233 SBSQ HOSP IP/OBS HIGH 50: CPT

## 2022-06-28 PROCEDURE — 99232 SBSQ HOSP IP/OBS MODERATE 35: CPT

## 2022-06-28 RX ORDER — BIMATOPROST 0.3 MG/ML
1 SOLUTION/ DROPS OPHTHALMIC
Qty: 1 | Refills: 0
Start: 2022-06-28 | End: 2022-07-27

## 2022-06-28 RX ORDER — ACETAMINOPHEN 500 MG
2 TABLET ORAL
Qty: 240 | Refills: 0
Start: 2022-06-28 | End: 2022-07-27

## 2022-06-28 RX ADMIN — Medication 81 MILLIGRAM(S): at 11:54

## 2022-06-28 RX ADMIN — BIMATOPROST 1 DROP(S): 0.3 SOLUTION/ DROPS OPHTHALMIC at 21:24

## 2022-06-28 RX ADMIN — Medication 2: at 11:53

## 2022-06-28 RX ADMIN — CARVEDILOL PHOSPHATE 12.5 MILLIGRAM(S): 80 CAPSULE, EXTENDED RELEASE ORAL at 06:03

## 2022-06-28 RX ADMIN — ATORVASTATIN CALCIUM 40 MILLIGRAM(S): 80 TABLET, FILM COATED ORAL at 21:24

## 2022-06-28 RX ADMIN — HEPARIN SODIUM 5000 UNIT(S): 5000 INJECTION INTRAVENOUS; SUBCUTANEOUS at 06:04

## 2022-06-28 NOTE — PROGRESS NOTE ADULT - SUBJECTIVE AND OBJECTIVE BOX
NEUROLOGY FOLLOW-UP NOTE    NAME:  SHABBIR YEAGER      ASSESSMENT:  88 RHM with balance difficulty and mild cognitive impairment with memory loss      RECOMMENDATIONS:    - After discussion with family, patient has consent to undergo lumbar puncture. This has been confirmed after discussion with son, Brendan, who the patient requested I contact to confirm consent after discussing risks and benefits.    - Hold heparin for at least 2 doses; okay to maintain Aspirin    - Please order the following CSF labs to be included with study: Cell Count, Glucose, Protein, Gram stain / Culture, Viral encephalitis panel, Cytology, and Flow cytometry    - Continue PT and walker to assist with ambulation    - DVT ppx: SCDs, Heparin          NOTE TO BE COMPLETED - PLEASE REFER TO ABOVE ONLY AND IGNORE INFORMATION BELOW    ******************************    HPI:  Patient is 88 year old male with past medical history of diabetes mellitus and hypertension, Vertigo is presenting to the ED with chief complaint of abdominal pain, generalized dizziness, weakness, and unable to tolerate PO for the last 2 days. Last night, Patient came to ED with vomiting, dizziness, and abdominal pain. He was discharged yesterday when he felt better and felt well enough to go home. Today patient spent most of his time in bed and only was able to take a few bites, which he still vomited back out, However, Patient feels much better in ED by the time of examination and was asking if he can go home. Patient denied any chest pain, change in bowel movement. (26 Jun 2022 02:11)      NEURO HPI:      INTERVAL HISTORY:      MEDICATIONS:  acetaminophen     Tablet .. 650 milliGRAM(s) Oral every 6 hours PRN  aspirin  chewable 81 milliGRAM(s) Oral daily  atorvastatin 40 milliGRAM(s) Oral at bedtime  bimatoprost 0.01% Ophthalmic Solution 1 Drop(s) Both EYES at bedtime  carvedilol 12.5 milliGRAM(s) Oral every 12 hours  insulin lispro (ADMELOG) corrective regimen sliding scale   SubCutaneous three times a day before meals  meclizine 25 milliGRAM(s) Oral three times a day PRN  ondansetron Injectable 4 milliGRAM(s) IV Push every 8 hours PRN  Rhopressa 0.02% eye drops 1 Drop(s) 1 Drop(s) Both EYES at bedtime  sodium chloride 0.9%. 1000 milliLiter(s) IV Continuous <Continuous>      ALLERGIES:  No Known Allergies      REVIEW OF SYSTEMS:  Fourteen systems reviewed and negative except as in HPI / Interval History.        OBJECTIVE:  Vital Signs Last 24 Hrs  T(C): 36.4 (28 Jun 2022 20:46), Max: 36.4 (28 Jun 2022 14:21)  T(F): 97.6 (28 Jun 2022 20:46), Max: 97.6 (28 Jun 2022 20:46)  HR: 62 (28 Jun 2022 20:46) (52 - 66)  BP: 135/71 (28 Jun 2022 20:46) (124/60 - 155/80)  BP(mean): --  RR: 18 (28 Jun 2022 20:46) (17 - 18)  SpO2: 100% (28 Jun 2022 20:46) (95% - 100%)    General Examination:  General: No acute distress  HEENT: Atraumatic, Normocephalic  Respiratory: CTA B/l.  No crackles, rhonchi, or wheezes.  Cardiovascular: RRR.  Normal S1 & S2.  Normal b/l radial and pedal pulses.    Neurological Examination:  General / Mental Status: AAO x 3.  No aphasia or dysarthria.  Naming and repetition intact.  Cranial Nerves: VFF x 4.  PERRL.  EOMI x 2, No nystagmus or diplopia.  B/l V1-V3 equal and intact to light touch and pinprick.  Symmetric facial movement and palate elevation.  B/l hearing equal to finger rub.  5/5 strength with b/l sternocleidomastoid & trapezius.  Midline tongue protrusion, with no atrophy or fasciculations.  Motor: Normal bulk & tone in all four extremities.  5/5 strength throughout all four extremities.  No downward drift, rigidity, spasticity, or tremors in any of the four extremities.  Sensory: Intact to light touch and pinprick in all four extremities.  Negative Romberg.  Reflex: 2+ and symmetric at b/l biceps, triceps, brachioradialis, patellae, and ankles.  Downgoing toes b/l.  Coordination: No dysmetria with b/l finger-to-nose and heel raise tests.  Symmetric rapid alternating movements b/l.  Gait: Normal, narrow-based gait.  No difficulty with tiptoe, heel, and tandem gaits.        LABORATORY VALUES:                          11.1   8.73  )-----------( 188      ( 28 Jun 2022 07:02 )             33.4       06-28    134<L>  |  102  |  15  ----------------------------<  107<H>  3.8   |  25  |  1.12    Ca    8.9      28 Jun 2022 07:02  Phos  2.5     06-27  Mg     2.1     06-27    TPro  6.7  /  Alb  3.1<L>  /  TBili  1.0  /  DBili  x   /  AST  17  /  ALT  16  /  AlkPhos  38<L>  06-27      LIVER FUNCTIONS - ( 27 Jun 2022 06:28 )  Alb: 3.1 g/dL / Pro: 6.7 g/dL / ALK PHOS: 38 U/L / ALT: 16 U/L DA / AST: 17 U/L / GGT: x                 Glucose Trend  06-28-22 @ 21:17   -  -- -- 196<H>  06-28-22 @ 16:42   -  -- -- 144<H>  06-28-22 @ 11:27   -  -- -- 172<H>  06-28-22 @ 07:36   -  -- -- 105<H>  06-28-22 @ 07:02   -  -- 107<H> --  06-27-22 @ 21:43   -  -- -- 203<H>  06-27-22 @ 16:32   -  -- -- 111<H>  06-27-22 @ 11:30   -  -- -- 132<H>  06-27-22 @ 07:37   -  -- -- 115<H>  06-27-22 @ 06:28   -  -- 105<H> --                    NEUROIMAGING:          Please contact the Neurology consult service with any neurological questions.      Austin Birmingham MD   of Neurology  [facility] [employer] [facility] at [facility]         NEUROLOGY FOLLOW-UP NOTE    NAME:  SHABBIR YEAGER      ASSESSMENT:  88 RHM with balance difficulty and mild cognitive impairment with memory loss      RECOMMENDATIONS:    - After discussion with family, patient has consent to undergo lumbar puncture. This has been confirmed after discussion with son, Brendan, who the patient requested I contact to confirm consent after discussing risks and benefits.    - Hold heparin for at least 2 doses; okay to maintain Aspirin    - Please order the following CSF labs to be included with study: Cell Count, Glucose, Protein, Gram stain / Culture, Viral encephalitis panel, Cytology, and Flow cytometry    - Continue PT and walker to assist with ambulation    - DVT ppx: SCDs, Heparin        ******************************    HPI:  Patient is 88 year old male with past medical history of diabetes mellitus and hypertension, Vertigo is presenting to the ED with chief complaint of abdominal pain, generalized dizziness, weakness, and unable to tolerate PO for the last 2 days. Last night, Patient came to ED with vomiting, dizziness, and abdominal pain. He was discharged yesterday when he felt better and felt well enough to go home. Today patient spent most of his time in bed and only was able to take a few bites, which he still vomited back out, However, Patient feels much better in ED by the time of examination and was asking if he can go home. Patient denied any chest pain, change in bowel movement. 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      INTERVAL HISTORY:  The patient is able to stand and ambulate with the assistance of a physical therapist, but has not been experiencing dizziness overnight.      MEDICATIONS:  acetaminophen     Tablet .. 650 milliGRAM(s) Oral every 6 hours PRN  aspirin  chewable 81 milliGRAM(s) Oral daily  atorvastatin 40 milliGRAM(s) Oral at bedtime  bimatoprost 0.01% Ophthalmic Solution 1 Drop(s) Both EYES at bedtime  carvedilol 12.5 milliGRAM(s) Oral every 12 hours  insulin lispro (ADMELOG) corrective regimen sliding scale   SubCutaneous three times a day before meals  meclizine 25 milliGRAM(s) Oral three times a day PRN  ondansetron Injectable 4 milliGRAM(s) IV Push every 8 hours PRN  Rhopressa 0.02% eye drops 1 Drop(s) 1 Drop(s) Both EYES at bedtime  sodium chloride 0.9%. 1000 milliLiter(s) IV Continuous <Continuous>      ALLERGIES:  No Known Allergies      REVIEW OF SYSTEMS:  Fourteen systems reviewed and negative except as in HPI / Interval History.        OBJECTIVE:  Vital Signs Last 24 Hrs  T(C): 36.4 (28 Jun 2022 20:46), Max: 36.4 (28 Jun 2022 14:21)  T(F): 97.6 (28 Jun 2022 20:46), Max: 97.6 (28 Jun 2022 20:46)  HR: 62 (28 Jun 2022 20:46) (52 - 66)  BP: 135/71 (28 Jun 2022 20:46) (124/60 - 155/80)  RR: 18 (28 Jun 2022 20:46) (17 - 18)  SpO2: 100% (28 Jun 2022 20:46) (95% - 100%)    General Examination:  General: No acute distress  HEENT: Atraumatic, Normocephalic  Respiratory: CTA B/l.  No crackles, rhonchi, or wheezes.  Cardiovascular: Low heart rate, Regular rhythm.  Normal S1 & S2.  Normal b/l radial and pedal pulses.    Neurological Examination:  General / Mental Status: AAO x 3.  No aphasia or dysarthria.  Immediate recall: 3/3, 5-minute delayed recall: 2/3.  Cranial Nerves: VFF x 4.  PERRL.  EOMI x 2, No nystagmus or diplopia.  B/l V1-V3 equal and intact to light touch and pinprick.  Symmetric facial movement and palate elevation.  B/l hearing equal to finger rub.  5/5 strength with b/l sternocleidomastoid & trapezius.  Midline tongue protrusion, with no atrophy or fasciculations.  Motor: Normal bulk & tone in all four extremities.  5/5 strength throughout b/l upper extremities, with b/l hand  strength equal.  At least 4/5 strength throughout b/l lower extremities.  No downward drift, rigidity, spasticity, or tremors in any of the four extremities.  Sensory: Intact to light touch and pinprick in all four extremities.  Reflex: 1+ and symmetric at b/l biceps, triceps, brachioradialis, patellae, and ankles.  Mute toes b/l.  Coordination: No dysmetria with b/l finger-to-nose and heel raise tests.  Gait/Romberg: Requires support to stand with slightly wide base.  Unable to participate in Romberg sign, gait, or specialized gait testing.        LABORATORY VALUES:                          11.1   8.73  )-----------( 188      ( 28 Jun 2022 07:02 )             33.4       06-28    134<L>  |  102  |  15  ----------------------------<  107<H>  3.8   |  25  |  1.12    Ca    8.9      28 Jun 2022 07:02  Phos  2.5     06-27  Mg     2.1     06-27    TPro  6.7  /  Alb  3.1<L>  /  TBili  1.0  /  DBili  x   /  AST  17  /  ALT  16  /  AlkPhos  38<L>  06-27      Glucose Trend  06-28-22 @ 21:17   -  -- -- 196<H>  06-28-22 @ 16:42   -  -- -- 144<H>  06-28-22 @ 11:27   -  -- -- 172<H>  06-28-22 @ 07:36   -  -- -- 105<H>  06-28-22 @ 07:02   -  -- 107<H> --  06-27-22 @ 21:43   -  -- -- 203<H>  06-27-22 @ 16:32   -  -- -- 111<H>  06-27-22 @ 11:30   -  -- -- 132<H>  06-27-22 @ 07:37   -  -- -- 115<H>  06-27-22 @ 06:28   -  -- 105<H> --          NEUROIMAGING:      MRI Brain w/wo Gadolinium (6/24/22): Per report,  - Incomplete thin linear enhancement along walls of lateral, third, and fourth ventricles  - Thick linear enhancement of b/l inferior third ventricle walls adjacent to hypothalami and along b/l anterolateral fourth ventricle walls  - 5 mm nodular focus of enhancement involving right hypothalamus and anterolateral wall of third ventricle  - Mild chronic microvascular changes  - Thinning and 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      Austin Birmingham MD   of Neurology  [facility] at [facility]

## 2022-06-28 NOTE — PROGRESS NOTE ADULT - ASSESSMENT
Patient is 88 year old male with past medical history of diabetes mellitus and hypertension, Vertigo is presenting to the ED with chief complaint of abdominal pain, generalized dizziness, weakness, and unable to tolerate PO for the last 2 days. Last night, Patient came to ED with vomiting, dizziness, and abdominal pain. He was discharged yesterday when he felt better and felt well enough to go home. Today patient spent most of his time in bed and only was able to take a few bites, which he still vomited back out. CT abdomen and pelvis IV contrast didn't show any acute abnormalities. Patient will be admitted to medicine for abdominal pain/ lymphoma work up  Patient PCP Dr. Santhosh Em [phone]. endorse abnormal outpatient brain MRI, will likely require LP with flow cytometry.  Neuro Dr. Birmingham and neurosurgery Dr. Clayton consulted.  No neurosurgery indicated.  Hem/Onc QMA consulted.

## 2022-06-28 NOTE — PROGRESS NOTE ADULT - NS ATTEND AMEND GEN_ALL_CORE FT
I have examined the patient at bedside and reviewed patient's data and participated in the management of the patient along with Natali AYALA as well as hemotology/med oncology faculty consisting of Dr. Daniel Blue, Dr. WATSON Ann, Dr. SOLANGE Hartman, Dr. Geovanny Deshpande, Dr. Brooke Dudley, Dr. Syd Woodson as well as myself during the daily heme/onc case review. I reviewed pertinent clinical information, PE,  labs as well as A/P as outline above.     Pt agrees to have LP w/ flow cytometry. Await results.

## 2022-06-28 NOTE — DISCHARGE NOTE NURSING/CASE MANAGEMENT/SOCIAL WORK - NSDCPEFALRISK_GEN_ALL_CORE
For information on Fall & Injury Prevention, visit: [email or web address] OR  [email or web address] OR  https://www.cdc.gov/steadi/patient.html

## 2022-06-28 NOTE — PROGRESS NOTE ADULT - ASSESSMENT
complete note to follow   Assessment and Recommendation:   · Assessment	    88 year old male with past medical history of diabetes mellitus and hypertension, Vertigo is presenting to the ED with chief complaint of abdominal pain, generalized dizziness, weakness, and unable to tolerate PO for the last 2 days. Last night, Patient came to ED with vomiting, dizziness, and abdominal pain. He was discharged yesterday when he felt better and felt well enough to go home. Today patient spent most of his time in bed and only was able to take a few bites, which he still vomited back out, However, Patient feels much better in ED by the time of examination and was asking if he can go home. Patient denied any chest pain, change in bowel movement.    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 Mak at [facility]  transformation to diffuse  B-cell Lymphoma with Splenic infiltration, s/p splenectomy, R-CHOP  outside brain MRI 6/24/22: shows linear and nodular enhancement of the ependymal lining of the ventricular system susp for malignancy. 4mm and 6mm nodular enhancements left lateral ventricle body and atrium. 5mm nodular focus Right hypothalamus  *Brain MRI PACS imaging is available on pt's cell phone  CT A/P (-)  Rec's:  -LDH, ESR are wnl  -Chest CT shows subcm B/L pulm nodules  -Called and spoke with Dr. Mak he advised that pt should have LP with flow during this admission, discussed with pt and Dr. Em who also agrees with procedure, pt reviewed with his son and now agree  -Neurology consult appreciated, rec LP with Flow  -NeuroSx consult, no Sx indicated  -further recommendations pending above    above discussed with Pt's son Brendan [phone]    Thank you for the referral. Will continue to monitor the patient.  Please call with any questions [phone]  Above reviewed with Attending Dr. Galvez  A/[facility] Hem/Onc  [address] [facility], [address], [place], [place]  [phone]  *Note not finalized until signed by Attending Physician

## 2022-06-28 NOTE — DISCHARGE NOTE PROVIDER - PROVIDER TOKENS
PROVIDER:[TOKEN:[[id number]]],PROVIDER:[TOKEN:[[id number]]] PROVIDER:[TOKEN:[[id number]]],PROVIDER:[TOKEN:[[id number]]],PROVIDER:[TOKEN:[[id number]]]

## 2022-06-28 NOTE — DISCHARGE NOTE PROVIDER - DETAILS OF MALNUTRITION DIAGNOSIS/DIAGNOSES
This patient has been assessed with a concern for Malnutrition and was treated during this hospitalization for the following Nutrition diagnosis/diagnoses:     -  06/30/2022: Moderate protein-calorie malnutrition

## 2022-06-28 NOTE — DISCHARGE NOTE NURSING/CASE MANAGEMENT/SOCIAL WORK - NSDCFUADDAPPT_GEN_ALL_CORE_FT
Attending Dr. Galvez  A/[facility] Hem/Onc  [address], [address], [place], [place]  [phone]

## 2022-06-28 NOTE — GOALS OF CARE CONVERSATION - ADVANCED CARE PLANNING - CONVERSATION DETAILS
Patient A+OX3, pleasant calm. Spoke in full detail with patient at bedside regarding life sustaining treatment in the event of cardiac and respiratory arrest. Patient states that he want to live and not die. Patient does want to have CPR and mechanical ventilation. Patient wants all life saving measures to stay alive. Patient designates son Brendan Mosher as his Health Care Proxy, and wants him to make all medical decision regarding his care at this time. Son needs to be called for all life saving measures. Patient FULL CODE.

## 2022-06-28 NOTE — DISCHARGE NOTE PROVIDER - HOSPITAL COURSE
Patient is 88 year old male with past medical history of diabetes mellitus and hypertension, Vertigo is presenting to the ED with chief complaint of abdominal pain, generalized dizziness, weakness, and unable to tolerate PO for the last 2 days. Last night, Patient came to ED with vomiting, dizziness, and abdominal pain. He was discharged yesterday when he felt better and felt well enough to go home. Today patient spent most of his time in bed and only was able to take a few bites, which he still vomited back out, However, Patient feels much better in ED by the time of examination and was asking if he can go home. CT abdomen and pelvis IV contrast didn't show any acute abnormalities. Patient will be admitted for MARICRUZ   Patient is 88 year old male with past medical history of diabetes mellitus and hypertension, Vertigo is presenting to the ED with chief complaint of abdominal pain, generalized dizziness, weakness, and unable to tolerate PO for the last 2 days. Last night, Patient came to ED with vomiting, dizziness, and abdominal pain. He was discharged yesterday when he felt better and felt well enough to go home. Today patient spent most of his time in bed and only was able to take a few bites, which he still vomited back out. CT abdomen and pelvis IV contrast didn't show any acute abnormalities. Patient will be admitted to medicine for abdominal pain.   Patient PCP Dr. Santhosh Em [phone]. endorse abnormal outpatient brain MRI, will likely require LP with flow cytometry.  Neuro Dr. Birmingham and neurosurgery Dr. Clayton consulted.  No neurosurgery indicated.  Hem/Onc QMA consulted.       Please note that this a brief summary of hospital course please refer to daily progress notes and consult notes for full course and events. Patient seen and examined at bedside, discussed with medical attending. Patient medically cleared for discharge to home with home PT. Patient to follow up outpatient for further Lymphoma work up with HemOnc and Neurology for further medical management. Patient is 88 year old male with past medical history of diabetes mellitus and hypertension, Vertigo is presenting to the ED with chief complaint of abdominal pain, generalized dizziness, weakness, and unable to tolerate PO for the last 2 days. Last night, Patient came to ED with vomiting, dizziness, and abdominal pain. He was discharged yesterday when he felt better and felt well enough to go home. Today patient spent most of his time in bed and only was able to take a few bites, which he still vomited back out. CT abdomen and pelvis IV contrast didn't show any acute abnormalities. Patient will be admitted to medicine for abdominal pain/ lymphoma work up  Patient PCP Dr. Santhosh Em [phone]. endorse abnormal outpatient brain MRI, will likely require LP with flow cytometry.  Neuro Dr. Birmingham and neurosurgery Dr. Clayton consulted.  No neurosurgery indicated.  Hem/Onc QMA consulted.       Please note that this a brief summary of hospital course please refer to daily progress notes and consult notes for full course and events. Patient seen and examined at bedside, discussed with medical attending. Patient medically cleared for discharge to home with home PT. Patient to follow up outpatient for further Lymphoma work up with HemOnc and Neurology for further medical management.    incomplete Patient is 88 year old male with past medical history of diabetes mellitus and hypertension, Vertigo is presenting to the ED with chief complaint of abdominal pain, generalized dizziness, weakness, and unable to tolerate PO for the last 2 days. Last night, Patient came to ED with vomiting, dizziness, and abdominal pain. He was discharged yesterday when he felt better and felt well enough to go home. Today patient spent most of his time in bed and only was able to take a few bites, which he still vomited back out. CT abdomen and pelvis IV contrast didn't show any acute abnormalities. Patient  admitted to medicine for abdominal pain/ lymphoma work up  Patient PCP Dr. Santhosh Em [phone]. endorse abnormal outpatient brain MRI, will likely require LP with flow cytometry.  Neuro Dr. Birmingham and neurosurgery Dr. Clayton consulted.  No neurosurgery indicated.  Hem/Onc QMA consulted.   Pt. underwent IR LP 6/30, fluid analysis to be communicated to pt.'s OP hem/onc and PCP by Dr. Birmingham.  Pt. is medically stable for discharge to home.    Please note that this a brief summary of hospital course please refer to daily progress notes and consult notes for full course and events. Patient seen and examined at bedside, discussed with medical attending. Patient medically cleared for discharge to home with home PT. Patient to follow up outpatient for further Lymphoma work up with HemOnc and Neurology for further medical management.

## 2022-06-28 NOTE — PROGRESS NOTE ADULT - SUBJECTIVE AND OBJECTIVE BOX
Patient is a 88y old  Male who presents with a chief complaint of Abdominal Pain      SUBJECTIVE / OVERNIGHT EVENTS:      MEDICATIONS  (STANDING):  aspirin  chewable 81 milliGRAM(s) Oral daily  atorvastatin 40 milliGRAM(s) Oral at bedtime  bimatoprost 0.01% Ophthalmic Solution 1 Drop(s) Both EYES at bedtime  carvedilol 12.5 milliGRAM(s) Oral every 12 hours  heparin   Injectable 5000 Unit(s) SubCutaneous every 12 hours  insulin lispro (ADMELOG) corrective regimen sliding scale   SubCutaneous three times a day before meals  Rhopressa 0.02% eye drops 1 Drop(s) 1 Drop(s) Both EYES at bedtime  sodium chloride 0.9%. 1000 milliLiter(s) (80 mL/Hr) IV Continuous <Continuous>    MEDICATIONS  (PRN):  acetaminophen     Tablet .. 650 milliGRAM(s) Oral every 6 hours PRN Mild Pain (1 - 3)  meclizine 25 milliGRAM(s) Oral three times a day PRN Dizziness  ondansetron Injectable 4 milliGRAM(s) IV Push every 8 hours PRN Nausea and/or Vomiting          PHYSICAL EXAM:  Vital Signs Last 24 Hrs  T(C): 36.3 (28 Jun 2022 05:15), Max: 36.3 (27 Jun 2022 12:30)  T(F): 97.4 (28 Jun 2022 05:15), Max: 97.4 (28 Jun 2022 05:15)  HR: 59 (28 Jun 2022 05:15) (54 - 59)  BP: 155/80 (28 Jun 2022 05:15) (127/53 - 155/80)  BP(mean): --  RR: 18 (28 Jun 2022 05:15) (18 - 18)  SpO2: 95% (28 Jun 2022 05:15) (95% - 98%)    LABS:                        11.1   8.73  )-----------( 188      ( 28 Jun 2022 07:02 )             33.4     06-28    134<L>  |  102  |  15  ----------------------------<  107<H>  3.8   |  25  |  1.12    Ca    8.9      28 Jun 2022 07:02  Phos  2.5     06-27  Mg     2.1     06-27    TPro  6.7  /  Alb  3.1<L>  /  TBili  1.0  /  DBili  x   /  AST  17  /  ALT  16  /  AlkPhos  38<L>  06-27              COVID-19 PCR: NotDetec (25 Jun 2022 22:28)  COVID-19 PCR: NotDetec (24 Jun 2022 17:54)           Patient is a 88y old  Male who presents with a chief complaint of Abdominal Pain    SUBJECTIVE / OVERNIGHT EVENTS: events noted. Still c/o dizziness, denies H/A. I advised the pt that I spoke with his Oncologist Dr. Mak and he recommends the pt have the LP with Flow during admission, pt is hesitant d/t hx of procedure and SE he developed      MEDICATIONS  (STANDING):  aspirin  chewable 81 milliGRAM(s) Oral daily  atorvastatin 40 milliGRAM(s) Oral at bedtime  bimatoprost 0.01% Ophthalmic Solution 1 Drop(s) Both EYES at bedtime  carvedilol 12.5 milliGRAM(s) Oral every 12 hours  heparin   Injectable 5000 Unit(s) SubCutaneous every 12 hours  insulin lispro (ADMELOG) corrective regimen sliding scale   SubCutaneous three times a day before meals  Rhopressa 0.02% eye drops 1 Drop(s) 1 Drop(s) Both EYES at bedtime  sodium chloride 0.9%. 1000 milliLiter(s) (80 mL/Hr) IV Continuous <Continuous>    MEDICATIONS  (PRN):  acetaminophen     Tablet .. 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)  LUNGS: CTA B/L  HEART: S1 S2  ABDOMEN: soft, non-tender, non-distended, + BS  EXTREMITIES: no edema  NERVOUS SYSTEM:  Awake and alert; no focal neuro deficits      LABS:                        11.1   8.73  )-----------( 188      ( 28 Jun 2022 07:02 )             33.4     06-28    134<L>  |  102  |  15  ----------------------------<  107<H>  3.8   |  25  |  1.12    Ca    8.9      28 Jun 2022 07:02  Phos  2.5     06-27  Mg     2.1     06-27    TPro  6.7  /  Alb  3.1<L>  /  TBili  1.0  /  DBili  x   /  AST  17  /  ALT  16  /  AlkPhos  38<L>  06-27              COVID-19 PCR: NotDetec (25 Jun 2022 22:28)  COVID-19 PCR: NotDetec (24 Jun 2022 17:54)

## 2022-06-28 NOTE — DISCHARGE NOTE PROVIDER - NSDCCPCAREPLAN_GEN_ALL_CORE_FT
PRINCIPAL DISCHARGE DIAGNOSIS  Diagnosis: Abdominal pain  Assessment and Plan of Treatment: You were treated for abdominal pain, nausea and vomting. Your CT abdomen and pelvis did not result acute changes.   Please follow with up with your Primary Care Physician for further management.      SECONDARY DISCHARGE DIAGNOSES  Diagnosis: Lung nodules  Assessment and Plan of Treatment: Your CT chest resulyed nodules. Please follow up with your Primary Care Physician for a possible repeat CT chest to monitor your nodules and further recommendations.    Diagnosis: Hypertension  Assessment and Plan of Treatment: Please continue taking your medication as prescribed. If you have any questions or concerns about your medication please direct them to your prescribing Healthcare Provider.      Diagnosis: Diabetes mellitus  Assessment and Plan of Treatment: Please continue taking your medication as prescribed. If you have any questions or concerns about your medication please direct them to your prescribing Healthcare Provider.      Diagnosis: Abnormal brain MRI  Assessment and Plan of Treatment: Your PCP endorsed you have an abnormal Brain MRI.   Please follow up with Neurology and Neurosurgery as an outpatient.  You are being recommended a lumbar puncture for further medical management and work yp.   Please follow up in 1 week.     PRINCIPAL DISCHARGE DIAGNOSIS  Diagnosis: Abdominal pain  Assessment and Plan of Treatment: You were treated for abdominal pain, nausea and vomting. Your CT abdomen and pelvis did not result acute changes.   Please follow with up with your Primary Care Physician for further management.      SECONDARY DISCHARGE DIAGNOSES  Diagnosis: Abnormal brain MRI  Assessment and Plan of Treatment: Your PCP endorsed you have an abnormal Brain MRI.   You were followed by neurosurgery, neurology and oncology.  You underwent lumbar puncture on 6/30.  Results of study will be sent to your oncologist.  Please follow up with Neurology and oncology as an outpatient.      Diagnosis: Lung nodules  Assessment and Plan of Treatment: Your CT chest resulted nodules. Please follow up with your Primary Care Physician for a possible repeat CT chest to monitor your nodules and further recommendations.    Diagnosis: Hypertension  Assessment and Plan of Treatment: Please continue taking your medication as prescribed. If you have any questions or concerns about your medication please direct them to your prescribing Healthcare Provider.      Diagnosis: Diabetes mellitus  Assessment and Plan of Treatment: Please continue taking your medication as prescribed. If you have any questions or concerns about your medication please direct them to your prescribing Healthcare Provider.

## 2022-06-28 NOTE — PROGRESS NOTE ADULT - PROBLEM SELECTOR PLAN 1
Pt. reports increased weakness, dizziness and unsteady gait  -OP brain MRI abnormal as reported by pt.'s PCP Dr. Em  -Neuro Dr. Birmingham consulted  -Hem/onc QMA consulted  -Neurosurgery Dr. Clayton consulted-no surgical indication  -Will likely need LP with flow cytometry Pt. reports increased weakness, dizziness and unsteady gait  -OP brain MRI abnormal as reported by pt.'s PCP Dr. Em  -Neuro Dr. Birmingham consulted  -Hem/onc QMA consulted  -Neurosurgery Dr. Clayton consulted-no surgical indication  -LP plan for tomorrow

## 2022-06-28 NOTE — DISCHARGE NOTE PROVIDER - CARE PROVIDERS DIRECT ADDRESSES
,DirectAddress_Unknown,[email or web address] ,DirectAddress_Unknown,[email or web address],DirectAddress_Unknown

## 2022-06-28 NOTE — DISCHARGE NOTE PROVIDER - ATTENDING DISCHARGE PHYSICAL EXAMINATION:
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 Pt p/w nausea, CT abd w/ contrast was unremarkable. Abd exam benign and no further nausea/vomiting. Tolerating diet, encouraged patient to take his meals. Pt with known hx of  lymphoma,  follows with Dr. Mak at [facility]. He had recent out-patient MR head performed which showed  6/24/22, shows linear and nodular enhancement of the ependymal lining of the ventricular system susp for malignancy. 4mm and 6mm nodular enhancements left lateral ventricle body and atrium. 5mm nodular focus Right hypothalamus. LP performed concerning for leptomeningeal involvement. Patient will follow up with neurology/oncology for flow cytometry results. PT recommending home with PT. Stable for discharge.   PHYSICAL EXAM:  well developed  GENERAL: NAD  HEENT: Normocephalic;  conjunctivae and sclerae clear; moist mucous membranes;   NECK : supple  CHEST/LUNG: Clear to auscultation bilaterally with good air entry   HEART: S1 S2  regular; no murmurs, gallops or rubs  ABDOMEN: Soft, Nontender, Nondistended; Bowel sounds present  EXTREMITIES: no cyanosis; no edema; no calf tenderness  SKIN: warm and dry; no rash  NERVOUS SYSTEM:  Awake and alert; Oriented  to place, person and time ; no new deficits

## 2022-06-28 NOTE — PROGRESS NOTE ADULT - NS ATTEND AMEND GEN_ALL_CORE FT
88 year old male with past medical history of Lymphoma, diabetes mellitus and hypertension, chronic Vertigo p/w c/o with chief complaint of abdominal pain, dizziness, weakness, and unable to tolerate PO for the last 2 days after getting MRI brain.   Patient also with new dizziness different from chronic vertigo from the past two weeks, describes it problem with balance while walking/ was evaluated by PT who advised home with home PT.     Pt was seen and examined, denies any abdominal pain, further N/V but said that he has not had anything to eat and is scared that he may get recurrent symptoms.     Labs reviewed.    PE as above     A/P:  #Intractable Nausea  #Known CNS Lymphoma, now with new leptomeningeal enhancement   #MARICRUZ - resolved   #Anemia 88 year old male with past medical history of Lymphoma, diabetes mellitus and hypertension, chronic Vertigo p/w c/o with chief complaint of abdominal pain, dizziness, weakness, and unable to tolerate PO for the last 2 days after getting MRI brain.   Patient also with new dizziness different from chronic vertigo from the past two weeks, describes it problem with balance while walking/ was evaluated by PT who advised home with home PT.     Pt was seen and examined, denies any abdominal pain, further N/V but said that he has not had anything to eat and is scared that he may get recurrent symptoms.     Labs reviewed.    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 Abd exam benign and no further nausea/vomiting. Tolerating diet, encouraged patient to take his meals.   -Pt with known hx of  lymphoma,  follows with Dr. Mak at [facility]. He had recent out-patient MR head performed which showed  6/24/22, shows linear and nodular enhancement of the ependymal lining of the ventricular system susp for malignancy. 4mm and 6mm nodular enhancements left lateral ventricle body and atrium. 5mm nodular focus Right hypothalamus. Per discussion with his out-patient providers Dr. Mak and Dr Em (by prior hospitalist and heme/onc team at [facility]) it was strongly encouraged to get LP done in-patient. Initially patient refused and was reluctant, now deferring all decision making to his son Mr. Cardona. Plan to get LP in am, heparin on hold- Neurology Dr. Birmingham aware. Spoke with patient's son who is in agreement with plan. 88 year old male with past medical history of Lymphoma, diabetes mellitus and hypertension, chronic Vertigo p/w c/o with chief complaint of abdominal pain, dizziness, weakness, and unable to tolerate PO for the last 2 days after getting MRI brain.   Patient also with new dizziness different from chronic vertigo from the past two weeks, describes it problem with balance while walking/ was evaluated by PT who advised home with home PT.     Pt was seen and examined, denies any abdominal pain, further N/V but said that he has not had anything to eat and is scared that he may get recurrent symptoms.     Labs reviewed.    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 Abd exam benign and no further nausea/vomiting. Tolerating diet, encouraged patient to take his meals.   -Pt with known hx of  lymphoma,  follows with Dr. Mak at [facility]. He had recent out-patient MR head performed which showed  6/24/22, shows linear and nodular enhancement of the ependymal lining of the ventricular system susp for malignancy. 4mm and 6mm nodular enhancements left lateral ventricle body and atrium. 5mm nodular focus Right hypothalamus. Per discussion with his out-patient providers Dr. Mak and Dr Em (by prior hospitalist and heme/onc team at [facility]) it was strongly encouraged to get LP done in-patient. Initially patient refused and was reluctant, now deferring all decision making to his son Mr. Cardona. Plan to get LP with flow in am, heparin on hold- Neurology Dr. Birmingham aware. Spoke with patient's son who is in agreement with plan. Appreciate heme/onc consult

## 2022-06-28 NOTE — DISCHARGE NOTE PROVIDER - NSDCMRMEDTOKEN_GEN_ALL_CORE_FT
aspirin 81 mg oral tablet, chewable: 1 tab(s) orally once a day  atorvastatin 40 mg oral tablet: 1 tab(s) orally once a day  carvedilol 12.5 mg oral tablet: 1 tab(s) orally 2 times a day  Januvia 50 mg oral tablet: 1 tab(s) orally once a day  losartan 25 mg oral tablet: 1 tab(s) orally once a day  meclizine 25 mg oral tablet: 1 tab(s) orally 3 times a day   acetaminophen 325 mg oral tablet: 2 tab(s) orally every 6 hours, As needed, Mild Pain (1 - 3)  aspirin 81 mg oral tablet, chewable: 1 tab(s) orally once a day  atorvastatin 40 mg oral tablet: 1 tab(s) orally once a day  bimatoprost 0.01% ophthalmic solution: 1 drop(s) to each affected eye once a day (at bedtime)  carvedilol 12.5 mg oral tablet: 1 tab(s) orally 2 times a day  Januvia 50 mg oral tablet: 1 tab(s) orally once a day  losartan 25 mg oral tablet: 1 tab(s) orally once a day  meclizine 25 mg oral tablet: 1 tab(s) orally 3 times a day

## 2022-06-28 NOTE — DISCHARGE NOTE NURSING/CASE MANAGEMENT/SOCIAL WORK - PATIENT PORTAL LINK FT
You can access the FollowMyHealth Patient Portal offered by [facility] by registering at the following website: [email or web address]. By joining [facility]’s FollowMyHealth portal, you will also be able to view your health information using other applications (apps) compatible with our system.

## 2022-06-28 NOTE — PROGRESS NOTE ADULT - PROBLEM SELECTOR PLAN 8
Patient initially wanting LP outpatient, however patient son Brendan [phone] convince patient to have work up done inpatient.  pending LP Patient initially wanting LP outpatient, however patient son Brendan ([phone]) prefers to have patient to be worked up inpatient. Patient son wants to be primary decision maker.  pending LP Patient initially wanting LP outpatient, however patient son Brendan ([phone]) prefers to have patient to be worked up inpatient. Patient and son wants to be primary decision maker. Call son regarding consent and medical decision making.  pending LP tomorrow

## 2022-06-28 NOTE — PROGRESS NOTE ADULT - SUBJECTIVE AND OBJECTIVE BOX
NP Note discussed with  Primary Attending    Patient is a 88y old  Male who presents with a chief complaint of Abdominal Pain (28 Jun 2022 14:45)      INTERVAL HPI/OVERNIGHT EVENTS: no new complaints    MEDICATIONS  (STANDING):  aspirin  chewable 81 milliGRAM(s) Oral daily  atorvastatin 40 milliGRAM(s) Oral at bedtime  bimatoprost 0.01% Ophthalmic Solution 1 Drop(s) Both EYES at bedtime  carvedilol 12.5 milliGRAM(s) Oral every 12 hours  heparin   Injectable 5000 Unit(s) SubCutaneous every 12 hours  insulin lispro (ADMELOG) corrective regimen sliding scale   SubCutaneous three times a day before meals  Rhopressa 0.02% eye drops 1 Drop(s) 1 Drop(s) Both EYES at bedtime  sodium chloride 0.9%. 1000 milliLiter(s) (80 mL/Hr) IV Continuous <Continuous>    MEDICATIONS  (PRN):  acetaminophen     Tablet .. 650 milliGRAM(s) Oral every 6 hours PRN Mild Pain (1 - 3)  meclizine 25 milliGRAM(s) Oral three times a day PRN Dizziness  ondansetron Injectable 4 milliGRAM(s) IV Push every 8 hours PRN Nausea and/or Vomiting      __________________________________________________  REVIEW OF SYSTEMS:    CONSTITUTIONAL: No fever,   EYES: no acute visual disturbances  NECK: No pain or stiffness  RESPIRATORY: No cough; No shortness of breath  CARDIOVASCULAR: No chest pain, no palpitations  GASTROINTESTINAL: No pain. No nausea or vomiting; No diarrhea   NEUROLOGICAL: No headache or numbness, no tremors  MUSCULOSKELETAL: No joint pain, no muscle pain  GENITOURINARY: no dysuria, no frequency, no hesitancy  PSYCHIATRY: no depression , no anxiety  ALL OTHER  ROS negative        Vital Signs Last 24 Hrs  T(C): 36.4 (28 Jun 2022 14:21), Max: 36.4 (28 Jun 2022 14:21)  T(F): 97.5 (28 Jun 2022 14:21), Max: 97.5 (28 Jun 2022 14:21)  HR: 66 (28 Jun 2022 14:21) (58 - 66)  BP: 124/60 (28 Jun 2022 14:21) (124/60 - 155/80)  BP(mean): --  RR: 17 (28 Jun 2022 14:21) (17 - 18)  SpO2: 98% (28 Jun 2022 14:21) (95% - 98%)    ________________________________________________  PHYSICAL EXAM:  GENERAL: NAD  HEENT: Normocephalic;  conjunctivae and sclerae clear; moist mucous membranes;   NECK : supple  CHEST/LUNG: Clear to auscultation bilaterally with good air entry   HEART: S1 S2  regular; no murmurs, gallops or rubs  ABDOMEN: Soft, Nontender, Nondistended; Bowel sounds present  EXTREMITIES: no cyanosis; no edema; no calf tenderness  SKIN: warm and dry; no rash  NERVOUS SYSTEM:  Awake and alert; Oriented  to place, person and time ; no new deficits    _________________________________________________  LABS:                        11.1   8.73  )-----------( 188      ( 28 Jun 2022 07:02 )             33.4     06-28    134<L>  |  102  |  15  ----------------------------<  107<H>  3.8   |  25  |  1.12    Ca    8.9      28 Jun 2022 07:02  Phos  2.5     06-27  Mg     2.1     06-27    TPro  6.7  /  Alb  3.1<L>  /  TBili  1.0  /  DBili  x   /  AST  17  /  ALT  16  /  AlkPhos  38<L>  06-27        CAPILLARY BLOOD GLUCOSE      POCT Blood Glucose.: 172 mg/dL (28 Jun 2022 11:27)  POCT Blood Glucose.: 105 mg/dL (28 Jun 2022 07:36)  POCT Blood Glucose.: 203 mg/dL (27 Jun 2022 21:43)        RADIOLOGY & ADDITIONAL TESTS:    Imaging  Reviewed:  YES/NO    Consultant(s) Notes Reviewed:   YES/ No      Plan of care was discussed with patient and /or primary care giver; all questions and concerns were addressed  NP Note discussed with  Primary Attending    Patient is a 88y old  Male who presents with a chief complaint of Abdominal Pain (28 Jun 2022 14:45)      INTERVAL HPI/OVERNIGHT EVENTS: no new complaints    MEDICATIONS  (STANDING):  aspirin  chewable 81 milliGRAM(s) Oral daily  atorvastatin 40 milliGRAM(s) Oral at bedtime  bimatoprost 0.01% Ophthalmic Solution 1 Drop(s) Both EYES at bedtime  carvedilol 12.5 milliGRAM(s) Oral every 12 hours  heparin   Injectable 5000 Unit(s) SubCutaneous every 12 hours  insulin lispro (ADMELOG) corrective regimen sliding scale   SubCutaneous three times a day before meals  Rhopressa 0.02% eye drops 1 Drop(s) 1 Drop(s) Both EYES at bedtime  sodium chloride 0.9%. 1000 milliLiter(s) (80 mL/Hr) IV Continuous <Continuous>    MEDICATIONS  (PRN):  acetaminophen     Tablet .. 650 milliGRAM(s) Oral every 6 hours PRN Mild Pain (1 - 3)  meclizine 25 milliGRAM(s) Oral three times a day PRN Dizziness  ondansetron Injectable 4 milliGRAM(s) IV Push every 8 hours PRN Nausea and/or Vomiting      __________________________________________________  REVIEW OF SYSTEMS:    CONSTITUTIONAL: No fever,   EYES: no acute visual disturbances  NECK: No pain or stiffness  RESPIRATORY: No cough; No shortness of breath  CARDIOVASCULAR: No chest pain, no palpitations  GASTROINTESTINAL: No pain. No nausea or vomiting; No diarrhea   NEUROLOGICAL: No headache or numbness, no tremors  MUSCULOSKELETAL: No joint pain, no muscle pain  GENITOURINARY: no dysuria, no frequency, no hesitancy  PSYCHIATRY: no depression , no anxiety  ALL OTHER  ROS negative        Vital Signs Last 24 Hrs  T(C): 36.4 (28 Jun 2022 14:21), Max: 36.4 (28 Jun 2022 14:21)  T(F): 97.5 (28 Jun 2022 14:21), Max: 97.5 (28 Jun 2022 14:21)  HR: 66 (28 Jun 2022 14:21) (58 - 66)  BP: 124/60 (28 Jun 2022 14:21) (124/60 - 155/80)  BP(mean): --  RR: 17 (28 Jun 2022 14:21) (17 - 18)  SpO2: 98% (28 Jun 2022 14:21) (95% - 98%)    ________________________________________________  PHYSICAL EXAM:  GENERAL: NAD  HEENT: Normocephalic;  conjunctivae and sclerae clear; moist mucous membranes;   NECK : supple  CHEST/LUNG: Clear to auscultation bilaterally with good air entry   HEART: S1 S2  regular; no murmurs, gallops or rubs  ABDOMEN: Soft, Nontender, Nondistended; Bowel sounds present  EXTREMITIES: no cyanosis; no edema; no calf tenderness  SKIN: warm and dry; no rash  NERVOUS SYSTEM:  Awake and alert; Oriented  to place, person and time ; no new deficits    _________________________________________________  LABS:                        11.1   8.73  )-----------( 188      ( 28 Jun 2022 07:02 )             33.4     06-28    134<L>  |  102  |  15  ----------------------------<  107<H>  3.8   |  25  |  1.12    Ca    8.9      28 Jun 2022 07:02  Phos  2.5     06-27  Mg     2.1     06-27    TPro  6.7  /  Alb  3.1<L>  /  TBili  1.0  /  DBili  x   /  AST  17  /  ALT  16  /  AlkPhos  38<L>  06-27        CAPILLARY BLOOD GLUCOSE      POCT Blood Glucose.: 172 mg/dL (28 Jun 2022 11:27)  POCT Blood Glucose.: 105 mg/dL (28 Jun 2022 07:36)  POCT Blood Glucose.: 203 mg/dL (27 Jun 2022 21:43)        RADIOLOGY & ADDITIONAL TESTS:  < from: CT Chest No Cont (06.28.22 @ 10:21) >  INTERPRETATION:  Clinical indication: Lymphoma.    Axial CT images of the chest are obtained without intravenous   administration of contrast.    No prior chest CTs are available for comparison.    No enlarged axillary, mediastinal or hilar lymph nodes. Subcentimeter   small nonspecific mediastinal lymph nodes are noted.    Cardiomegaly. Trace pericardial fluid. Vascular calcifications with   involvementof the aorta and the coronary arteries. Trace right pleural   fluid. There may be minimal left pleural fluid. Bilateral gynecomastia.   Left anterior chest wall subcutaneous tissue cardiac loop recorder.    For complete evaluation of the abdomen, please refer to dedicated   recently performed contrast-enhanced abdominal CT of June 24, 2022.    Evaluation of the lungs demonstrate mild bilateral lower lung areas of   linear or subsegmental atelectasis within the lower lobes. A few   scattered bilateral nonspecific pulmonary nodules or nodular opacities   largest associated with the left fissure measuring about 4 mm. Reference   3 mm right lower lobe pulmonary nodule on image 101 of series 3.    2 mm calcified granulomas.    No pneumonia. No central endobronchial lesions.    Degenerative changes of the spine. Spinal scoliosis. 7 mm sclerotic focus   involving the inferior aspect of the T11 vertebral body likely a bone   island.    IMPRESSION: No intrathoracic lymphadenopathy.    Scattered bilateral small pulmonary nodules measuring up to 4 mm,   nonspecific finding. Follow-up noncontrast chest CT can be performed to   ensure stability.    --- End of Report ---      < end of copied text >  < from: CT Abdomen and Pelvis w/ IV Cont (06.24.22 @ 20:05) >    FINDINGS:  LOWER CHEST: Cardiomegaly. 3 mm subpleural nodule in the right middle   lobe.    LIVER: Within normal limits.  BILE DUCTS: Normal caliber.  GALLBLADDER: Cholelithiasis.  SPLEEN: Within normal limits.  PANCREAS: Within normal limits.  ADRENALS: 1.2 cm left adrenal nodule.  KIDNEYS/URETERS: Bilateral extrarenal pelvis. Multiple tiny   low-attenuation lesions bilaterally are indeterminate. 2 simple cysts in   the right kidney. A dominant low-attenuation lesion in the midpole of   left kidney measuring 2.9 cm (2, 32) and a 4 cm cyst in the right kidney   (2, 35) do not meet CT criteria for a simple cyst.    BLADDER: 1 cm stone in the left posterolateral urinary bladder.  REPRODUCTIVE ORGANS: Prostate is enlarged.    BOWEL: No bowel obstruction. Appendix is normal.  PERITONEUM: No ascites.  VESSELS: Atherosclerotic changes.  RETROPERITONEUM/LYMPH NODES: No lymphadenopathy.  ABDOMINAL WALL: Within normal limits.  BONES: Degenerative changes. Osteopenia.    IMPRESSION:  No acute abnormality in the abdomen and pelvis.  2 cystic lesions in the kidneys which may represent proteinaceous cysts.   However, ultrasound is advised for confirmation on outpatient basis.      --- End of Report ---    < end of copied text >    Imaging  Reviewed:  YES    Consultant(s) Notes Reviewed:   YES      Plan of care was discussed with patient and /or primary care giver; all questions and concerns were addressed

## 2022-06-29 LAB
ANION GAP SERPL CALC-SCNC: 7 MMOL/L — SIGNIFICANT CHANGE UP (ref 5–17)
APTT BLD: 31.4 SEC — SIGNIFICANT CHANGE UP (ref 27.5–35.5)
BUN SERPL-MCNC: 16 MG/DL — SIGNIFICANT CHANGE UP (ref 7–18)
CALCIUM SERPL-MCNC: 9.3 MG/DL — SIGNIFICANT CHANGE UP (ref 8.4–10.5)
CHLORIDE SERPL-SCNC: 98 MMOL/L — SIGNIFICANT CHANGE UP (ref 96–108)
CO2 SERPL-SCNC: 29 MMOL/L — SIGNIFICANT CHANGE UP (ref 22–31)
CREAT SERPL-MCNC: 1.17 MG/DL — SIGNIFICANT CHANGE UP (ref 0.5–1.3)
EGFR: 60 ML/MIN/1.73M2 — SIGNIFICANT CHANGE UP
GLUCOSE BLDC GLUCOMTR-MCNC: 123 MG/DL — HIGH (ref 70–99)
GLUCOSE BLDC GLUCOMTR-MCNC: 142 MG/DL — HIGH (ref 70–99)
GLUCOSE BLDC GLUCOMTR-MCNC: 143 MG/DL — HIGH (ref 70–99)
GLUCOSE BLDC GLUCOMTR-MCNC: 158 MG/DL — HIGH (ref 70–99)
GLUCOSE SERPL-MCNC: 163 MG/DL — HIGH (ref 70–99)
HCT VFR BLD CALC: 35.8 % — LOW (ref 39–50)
HGB BLD-MCNC: 11.8 G/DL — LOW (ref 13–17)
INR BLD: 1 RATIO — SIGNIFICANT CHANGE UP (ref 0.88–1.16)
MCHC RBC-ENTMCNC: 29.4 PG — SIGNIFICANT CHANGE UP (ref 27–34)
MCHC RBC-ENTMCNC: 33 GM/DL — SIGNIFICANT CHANGE UP (ref 32–36)
MCV RBC AUTO: 89.3 FL — SIGNIFICANT CHANGE UP (ref 80–100)
NRBC # BLD: 0 /100 WBCS — SIGNIFICANT CHANGE UP (ref 0–0)
PLATELET # BLD AUTO: 197 K/UL — SIGNIFICANT CHANGE UP (ref 150–400)
POTASSIUM SERPL-MCNC: 4.2 MMOL/L — SIGNIFICANT CHANGE UP (ref 3.5–5.3)
POTASSIUM SERPL-SCNC: 4.2 MMOL/L — SIGNIFICANT CHANGE UP (ref 3.5–5.3)
PROTHROM AB SERPL-ACNC: 11.9 SEC — SIGNIFICANT CHANGE UP (ref 10.5–13.4)
RBC # BLD: 4.01 M/UL — LOW (ref 4.2–5.8)
RBC # FLD: 14.6 % — HIGH (ref 10.3–14.5)
SODIUM SERPL-SCNC: 134 MMOL/L — LOW (ref 135–145)
WBC # BLD: 9.71 K/UL — SIGNIFICANT CHANGE UP (ref 3.8–10.5)
WBC # FLD AUTO: 9.71 K/UL — SIGNIFICANT CHANGE UP (ref 3.8–10.5)

## 2022-06-29 PROCEDURE — 99233 SBSQ HOSP IP/OBS HIGH 50: CPT

## 2022-06-29 PROCEDURE — 99232 SBSQ HOSP IP/OBS MODERATE 35: CPT | Mod: FS

## 2022-06-29 RX ORDER — MAGNESIUM HYDROXIDE 400 MG/1
30 TABLET, CHEWABLE ORAL DAILY
Refills: 0 | Status: DISCONTINUED | OUTPATIENT
Start: 2022-06-29 | End: 2022-07-01

## 2022-06-29 RX ADMIN — BIMATOPROST 1 DROP(S): 0.3 SOLUTION/ DROPS OPHTHALMIC at 22:24

## 2022-06-29 RX ADMIN — Medication 81 MILLIGRAM(S): at 12:10

## 2022-06-29 RX ADMIN — ATORVASTATIN CALCIUM 40 MILLIGRAM(S): 80 TABLET, FILM COATED ORAL at 22:24

## 2022-06-29 RX ADMIN — CARVEDILOL PHOSPHATE 12.5 MILLIGRAM(S): 80 CAPSULE, EXTENDED RELEASE ORAL at 05:10

## 2022-06-29 RX ADMIN — CARVEDILOL PHOSPHATE 12.5 MILLIGRAM(S): 80 CAPSULE, EXTENDED RELEASE ORAL at 17:34

## 2022-06-29 RX ADMIN — MAGNESIUM HYDROXIDE 30 MILLILITER(S): 400 TABLET, CHEWABLE ORAL at 14:57

## 2022-06-29 NOTE — PROGRESS NOTE ADULT - NS ATTEND AMEND GEN_ALL_CORE FT
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 Abd exam benign and no further nausea/vomiting. Tolerating diet, encouraged patient to take his meals.   -Pt with known hx of  lymphoma,  follows with Dr. Mak at [facility]. He had recent out-patient MR head performed which showed  6/24/22, shows linear and nodular enhancement of the ependymal lining of the ventricular system susp for malignancy. 4mm and 6mm nodular enhancements left lateral ventricle body and atrium. 5mm nodular focus Right hypothalamus. Per discussion with his out-patient providers Dr. Mak and Dr Em (by prior hospitalist and heme/onc team at [facility]) it was strongly encouraged to get LP done in-patient. Initially patient refused and was reluctant, now deferring all decision making to his son Mr. Cardona. Plan to get LP with flow today, heparin on hold- Neurology Dr. Birmingham aware. Spoke with patient's son who is in agreement with plan. Appreciate heme/onc consult.  -Possible d/c home tomorrow

## 2022-06-29 NOTE — PROGRESS NOTE ADULT - SUBJECTIVE AND OBJECTIVE BOX
NP Note discussed with  Primary Attending    Patient is a 88y old  Male who presents with a chief complaint of Abdominal Pain (28 Jun 2022 21:03)      INTERVAL HPI/OVERNIGHT EVENTS: no new complaints    MEDICATIONS  (STANDING):  aspirin  chewable 81 milliGRAM(s) Oral daily  atorvastatin 40 milliGRAM(s) Oral at bedtime  bimatoprost 0.01% Ophthalmic Solution 1 Drop(s) Both EYES at bedtime  carvedilol 12.5 milliGRAM(s) Oral every 12 hours  insulin lispro (ADMELOG) corrective regimen sliding scale   SubCutaneous three times a day before meals  Rhopressa 0.02% eye drops 1 Drop(s) 1 Drop(s) Both EYES at bedtime    MEDICATIONS  (PRN):  acetaminophen     Tablet .. 650 milliGRAM(s) Oral every 6 hours PRN Mild Pain (1 - 3)  meclizine 25 milliGRAM(s) Oral three times a day PRN Dizziness  ondansetron Injectable 4 milliGRAM(s) IV Push every 8 hours PRN Nausea and/or Vomiting      __________________________________________________  REVIEW OF SYSTEMS:    CONSTITUTIONAL: No fever,   EYES: no acute visual disturbances  NECK: No pain or stiffness  RESPIRATORY: No cough; No shortness of breath  CARDIOVASCULAR: No chest pain, no palpitations  GASTROINTESTINAL: No pain. No nausea or vomiting; No diarrhea   NEUROLOGICAL: No headache or numbness, no tremors  MUSCULOSKELETAL: No joint pain, no muscle pain  GENITOURINARY: no dysuria, no frequency, no hesitancy  PSYCHIATRY: no depression , no anxiety  ALL OTHER  ROS negative        Vital Signs Last 24 Hrs  T(C): 36.5 (29 Jun 2022 05:07), Max: 36.5 (29 Jun 2022 05:07)  T(F): 97.7 (29 Jun 2022 05:07), Max: 97.7 (29 Jun 2022 05:07)  HR: 63 (29 Jun 2022 05:07) (52 - 66)  BP: 151/70 (29 Jun 2022 05:07) (124/60 - 155/72)  BP(mean): --  RR: 16 (29 Jun 2022 05:07) (16 - 18)  SpO2: 97% (29 Jun 2022 05:07) (96% - 100%)    ________________________________________________  PHYSICAL EXAM:  GENERAL: NAD  HEENT: Normocephalic;  conjunctivae and sclerae clear; moist mucous membranes;   NECK : supple  CHEST/LUNG: Clear to auscultation bilaterally with good air entry   HEART: S1 S2  regular; no murmurs, gallops or rubs  ABDOMEN: Soft, Nontender, Nondistended; Bowel sounds present  EXTREMITIES: no cyanosis; no edema; no calf tenderness  SKIN: warm and dry; no rash  NERVOUS SYSTEM:  Awake and alert; Oriented  to place, person and time ; no new deficits    _________________________________________________  LABS:                        11.8   9.71  )-----------( 197      ( 29 Jun 2022 10:44 )             35.8     06-29    134<L>  |  98  |  16  ----------------------------<  163<H>  4.2   |  29  |  1.17    Ca    9.3      29 Jun 2022 10:44      PT/INR - ( 29 Jun 2022 10:44 )   PT: 11.9 sec;   INR: 1.00 ratio         PTT - ( 29 Jun 2022 10:44 )  PTT:31.4 sec    CAPILLARY BLOOD GLUCOSE      POCT Blood Glucose.: 143 mg/dL (29 Jun 2022 11:28)  POCT Blood Glucose.: 123 mg/dL (29 Jun 2022 07:43)  POCT Blood Glucose.: 196 mg/dL (28 Jun 2022 21:17)  POCT Blood Glucose.: 144 mg/dL (28 Jun 2022 16:42)    RADIOLOGY & ADDITIONAL TESTS:    < from: CT Chest No Cont (06.28.22 @ 10:21) >    ACC: [id number] EXAM:  CT CHEST                          PROCEDURE DATE:  06/28/2022          INTERPRETATION:  Clinical indication: Lymphoma.    Axial CT images of the chest are obtained without intravenous   administration of contrast.    No prior chest CTs are available for comparison.    No enlarged axillary, mediastinal or hilar lymph nodes. Subcentimeter   small nonspecific mediastinal lymph nodes are noted.    Cardiomegaly. Trace pericardial fluid. Vascular calcifications with   involvementof the aorta and the coronary arteries. Trace right pleural   fluid. There may be minimal left pleural fluid. Bilateral gynecomastia.   Left anterior chest wall subcutaneous tissue cardiac loop recorder.    For complete evaluation of the abdomen, please refer to dedicated   recently performed contrast-enhanced abdominal CT of June 24, 2022.    Evaluation of the lungs demonstrate mild bilateral lower lung areas of   linear or subsegmental atelectasis within the lower lobes. A few   scattered bilateral nonspecific pulmonary nodules or nodular opacities   largest associated with the left fissure measuring about 4 mm. Reference   3 mm right lower lobe pulmonary nodule on image 101 of series 3.    2 mm calcified granulomas.    No pneumonia. No central endobronchial lesions.    Degenerative changes of the spine. Spinal scoliosis. 7 mm sclerotic focus   involving the inferior aspect of the T11 vertebral body likely a bone   island.    IMPRESSION: No intrathoracic lymphadenopathy.    Scattered bilateral small pulmonary nodules measuring up to 4 mm,   nonspecific finding. Follow-up noncontrast chest CT can be performed to   ensure stability.    --- End of Report ---    < end of copied text >  < from: CT Abdomen and Pelvis w/ IV Cont (06.24.22 @ 20:05) >    ACC: [id number] EXAM:  CT ABDOMEN AND PELVIS IC                          PROCEDURE DATE:  06/24/2022          INTERPRETATION:  CLINICAL INFORMATION: Constipation, vomiting.    COMPARISON: None.    CONTRAST/COMPLICATIONS:  IV Contrast: Omnipaque 350  90 cc administered   10 cc discarded  Oral Contrast: NONE  Complications: None reported at time of study completion    PROCEDURE:  CT of the Abdomen and Pelvis was performed.  Sagittal and coronal reformats were performed.    FINDINGS:  LOWER CHEST: Cardiomegaly. 3 mm subpleural nodule in the right middle   lobe.    LIVER: Within normal limits.  BILE DUCTS: Normal caliber.  GALLBLADDER: Cholelithiasis.  SPLEEN: Within normal limits.  PANCREAS: Within normal limits.  ADRENALS: 1.2 cm left adrenal nodule.  KIDNEYS/URETERS: Bilateral extrarenal pelvis. Multiple tiny   low-attenuation lesions bilaterally are indeterminate. 2 simple cysts in   the right kidney. A dominant low-attenuation lesion in the midpole of   left kidney measuring 2.9 cm (2, 32) and a 4 cm cyst in the right kidney   (2, 35) do not meet CT criteria for a simple cyst.    BLADDER: 1 cm stone in the left posterolateral urinary bladder.  REPRODUCTIVE ORGANS: Prostate is enlarged.    BOWEL: No bowel obstruction. Appendix is normal.  PERITONEUM: No ascites.  VESSELS: Atherosclerotic changes.  RETROPERITONEUM/LYMPH NODES: No lymphadenopathy.  ABDOMINAL WALL: Within normal limits.  BONES: Degenerative changes. Osteopenia.    IMPRESSION:  No acute abnormality in the abdomen and pelvis.  2 cystic lesions in the kidneys which may represent proteinaceous cysts.   However, ultrasound is advised for confirmation on outpatient basis.      --- End of Report ---    < end of copied text >      Imaging Personally Reviewed:  YES/NO    Consultant(s) Notes Reviewed:   YES/ No    Care Discussed with Consultants :     Plan of care was discussed with patient and /or primary care giver; all questions and concerns were addressed and care was aligned with patient's wishes.

## 2022-06-29 NOTE — PROGRESS NOTE ADULT - PROBLEM SELECTOR PLAN 1
Pt. reports increased weakness, dizziness and unsteady gait  -OP brain MRI abnormal as reported by pt.'s PCP Dr. Em  -Neuro Dr. Birmingham following  -Hem/onc [facility] following  -Neurosurgery Dr. Clayton consulted-no surgical indication  -LP this pm, f/u results

## 2022-06-29 NOTE — PROGRESS NOTE ADULT - SUBJECTIVE AND OBJECTIVE BOX
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 The patient was accompanied by his wife and daughter at bedside, as well as by his son (Brendan) and daughter-in-law by telephone. After this discussion, the patient is declining having a lumbar puncture by me at the bedside. The patient states that he does not feel comfortable having the procedure in his patient bed and in a room where there is surrounding nose and another patient. The majority of his family agrees with this.    - As per patient and family's preference, please help arrange a lumbar puncture by interventional radiology to study the CSF tests as outlined below.    - Please order the following CSF labs to be included with study: Cell Count, Glucose, Protein, Gram stain / Culture, Viral encephalitis panel, Cytology, and Flow cytometry    - Continue to hold heparin, and hold Aspirin as well as any other blood thinners at this time    - Continue PT and walker to assist with ambulation    - DVT ppx: SCDs, Heparin    - Routine follow-up with the patient's established oncologist and neurologist          NOTE TO BE COMPLETED - PLEASE REFER TO ABOVE ONLY AND IGNORE INFORMATION BELOW    ******************************    HPI:  Patient is 88 year old male with past medical history of diabetes mellitus and hypertension, Vertigo is presenting to the ED with chief complaint of abdominal pain, generalized dizziness, weakness, and unable to tolerate PO for the last 2 days. Last night, Patient came to ED with vomiting, dizziness, and abdominal pain. He was discharged yesterday when he felt better and felt well enough to go home. Today patient spent most of his time in bed and only was able to take a few bites, which he still vomited back out, However, Patient feels much better in ED by the time of examination and was asking if he can go home. Patient denied any chest pain, change in bowel movement. (26 Jun 2022 02:11)      NEURO HPI:      INTERVAL HISTORY:      MEDICATIONS:  acetaminophen     Tablet .. 650 milliGRAM(s) Oral every 6 hours PRN  aspirin  chewable 81 milliGRAM(s) Oral daily  atorvastatin 40 milliGRAM(s) Oral at bedtime  bimatoprost 0.01% Ophthalmic Solution 1 Drop(s) Both EYES at bedtime  carvedilol 12.5 milliGRAM(s) Oral every 12 hours  insulin lispro (ADMELOG) corrective regimen sliding scale   SubCutaneous three times a day before meals  magnesium hydroxide Suspension 30 milliLiter(s) Oral daily PRN  meclizine 25 milliGRAM(s) Oral three times a day PRN  ondansetron Injectable 4 milliGRAM(s) IV Push every 8 hours PRN  Rhopressa 0.02% eye drops 1 Drop(s) 1 Drop(s) Both EYES at bedtime      ALLERGIES:  No Known Allergies      REVIEW OF SYSTEMS:  Fourteen systems reviewed and negative except as in HPI / Interval History.        OBJECTIVE:  Vital Signs Last 24 Hrs  T(C): 36.5 (29 Jun 2022 05:07), Max: 36.5 (29 Jun 2022 05:07)  T(F): 97.7 (29 Jun 2022 05:07), Max: 97.7 (29 Jun 2022 05:07)  HR: 63 (29 Jun 2022 05:07) (52 - 66)  BP: 151/70 (29 Jun 2022 05:07) (124/60 - 155/72)  RR: 16 (29 Jun 2022 05:07) (16 - 18)  SpO2: 97% (29 Jun 2022 05:07) (96% - 100%)    General Examination:  General: No acute distress  HEENT: Atraumatic, Normocephalic  Respiratory: CTA B/l.  No crackles, rhonchi, or wheezes.  Cardiovascular: RRR.  Normal S1 & S2.  Normal b/l radial and pedal pulses.    Neurological Examination:  General / Mental Status: AAO x 3.  No aphasia or dysarthria.  Naming and repetition intact.  Cranial Nerves: VFF x 4.  PERRL.  EOMI x 2, No nystagmus or diplopia.  B/l V1-V3 equal and intact to light touch and pinprick.  Symmetric facial movement and palate elevation.  B/l hearing equal to finger rub.  5/5 strength with b/l sternocleidomastoid & trapezius.  Midline tongue protrusion, with no atrophy or fasciculations.  Motor: Normal bulk & tone in all four extremities.  5/5 strength throughout all four extremities.  No downward drift, rigidity, spasticity, or tremors in any of the four extremities.  Sensory: Intact to light touch and pinprick in all four extremities.  Negative Romberg.  Reflex: 2+ and symmetric at b/l biceps, triceps, brachioradialis, patellae, and ankles.  Downgoing toes b/l.  Coordination: No dysmetria with b/l finger-to-nose and heel raise tests.  Symmetric rapid alternating movements b/l.  Gait: Normal, narrow-based gait.  No difficulty with tiptoe, heel, and tandem gaits.        LABORATORY VALUES:                          11.8   9.71  )-----------( 197      ( 29 Jun 2022 10:44 )             35.8       06-29    134<L>  |  98  |  16  ----------------------------<  163<H>  4.2   |  29  |  1.17    Ca    9.3      29 Jun 2022 10:44        Glucose Trend  06-29-22 @ 21:17   -  -- -- 158<H>  06-29-22 @ 16:35   -  -- -- 142<H>  06-29-22 @ 11:28   -  -- -- 143<H>  06-29-22 @ 10:44   -  -- 163<H> --  06-29-22 @ 07:43   -  -- -- 123<H>  06-28-22 @ 21:17   -  -- -- 196<H>  06-28-22 @ 16:42   -  -- -- 144<H>  06-28-22 @ 11:27   -  -- -- 172<H>  06-28-22 @ 07:36   -  -- -- 105<H>  06-28-22 @ 07:02   -  -- 107<H> --                    NEUROIMAGING:          Please contact the Neurology consult service with any neurological questions.      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 The patient was accompanied by his wife and daughter at bedside, as well as by his son (Brendan) and daughter-in-law by telephone. After this discussion, the patient is declining having a lumbar puncture by me at the bedside. The patient states that he does not feel comfortable having the procedure in his patient bed and in a room where there is surrounding nose and another patient. The majority of his family agrees with this.    - As per patient and family's preference, please help arrange a lumbar puncture by interventional radiology to study the CSF tests as outlined below.    - Please order the following CSF labs to be included with study: Cell Count, Glucose, Protein, Gram stain / Culture, Viral encephalitis panel, Cytology, and Flow cytometry    - Continue to hold heparin, and hold Aspirin as well as any other blood thinners at this time    - Continue PT and walker to assist with ambulation    - DVT ppx: SCDs, Heparin    - Routine follow-up with the patient's established oncologist and neurologist        ******************************    HPI:  Patient is 88 year old male with past medical history of diabetes mellitus and hypertension, Vertigo is presenting to the ED with chief complaint of abdominal pain, generalized dizziness, weakness, and unable to tolerate PO for the last 2 days. Last night, Patient came to ED with vomiting, dizziness, and abdominal pain. He was discharged yesterday when he felt better and felt well enough to go home. Today patient spent most of his time in bed and only was able to take a few bites, which he still vomited back out, However, Patient feels much better in ED by the time of examination and was asking if he can go home. Patient denied any chest pain, change in bowel movement. 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      INTERVAL HISTORY:  The patient continues to require support to stand and walk, but has not developed and new neurological deficits overnight.      MEDICATIONS:  acetaminophen     Tablet .. 650 milliGRAM(s) Oral every 6 hours PRN  aspirin  chewable 81 milliGRAM(s) Oral daily  atorvastatin 40 milliGRAM(s) Oral at bedtime  bimatoprost 0.01% Ophthalmic Solution 1 Drop(s) Both EYES at bedtime  carvedilol 12.5 milliGRAM(s) Oral every 12 hours  insulin lispro (ADMELOG) corrective regimen sliding scale   SubCutaneous three times a day before meals  magnesium hydroxide Suspension 30 milliLiter(s) Oral daily PRN  meclizine 25 milliGRAM(s) Oral three times a day PRN  ondansetron Injectable 4 milliGRAM(s) IV Push every 8 hours PRN  Rhopressa 0.02% eye drops 1 Drop(s) 1 Drop(s) Both EYES at bedtime      ALLERGIES:  No Known Allergies      REVIEW OF SYSTEMS:  Fourteen systems reviewed and negative except as in HPI / Interval History.        OBJECTIVE:  Vital Signs Last 24 Hrs  T(C): 36.5 (29 Jun 2022 05:07), Max: 36.5 (29 Jun 2022 05:07)  T(F): 97.7 (29 Jun 2022 05:07), Max: 97.7 (29 Jun 2022 05:07)  HR: 63 (29 Jun 2022 05:07) (52 - 66)  BP: 151/70 (29 Jun 2022 05:07) (124/60 - 155/72)  RR: 16 (29 Jun 2022 05:07) (16 - 18)  SpO2: 97% (29 Jun 2022 05:07) (96% - 100%)    General Examination:  General: No acute distress  HEENT: Atraumatic, Normocephalic  Respiratory: CTA B/l.  No crackles, rhonchi, or wheezes.  Cardiovascular: Low heart rate, Regular rhythm.  Normal S1 & S2.  Normal b/l radial and pedal pulses.    Neurological Examination:  General / Mental Status: AAO x 3.  No aphasia or dysarthria.  Immediate recall: 3/3, 5-minute delayed recall: 2/3.  Cranial Nerves: VFF x 4.  PERRL.  EOMI x 2, No nystagmus or diplopia.  B/l V1-V3 equal and intact to light touch and pinprick.  Symmetric facial movement and palate elevation.  B/l hearing equal to finger rub.  5/5 strength with b/l sternocleidomastoid & trapezius.  Midline tongue protrusion, with no atrophy or fasciculations.  Motor: Normal bulk & tone in all four extremities.  5/5 strength throughout b/l upper extremities, with b/l hand  strength equal.  At least 4/5 strength throughout b/l lower extremities.  No downward drift, rigidity, spasticity, or tremors in any of the four extremities.  Sensory: Intact to light touch and pinprick in all four extremities.  Reflex: 1+ and symmetric at b/l biceps, triceps, brachioradialis, patellae, and ankles.  Mute toes b/l.  Coordination: No dysmetria with b/l finger-to-nose and heel raise tests.  Gait/Romberg: Stands with slightly wide base, requiring support.  Unable to participate in Romberg sign, gait, or specialized gait testing.        LABORATORY VALUES:                          11.8   9.71  )-----------( 197      ( 29 Jun 2022 10:44 )             35.8       06-29    134<L>  |  98  |  16  ----------------------------<  163<H>  4.2   |  29  |  1.17    Ca    9.3      29 Jun 2022 10:44        Glucose Trend  06-29-22 @ 21:17   -  -- -- 158<H>  06-29-22 @ 16:35   -  -- -- 142<H>  06-29-22 @ 11:28   -  -- -- 143<H>  06-29-22 @ 10:44   -  -- 163<H> --  06-29-22 @ 07:43   -  -- -- 123<H>  06-28-22 @ 21:17   -  -- -- 196<H>  06-28-22 @ 16:42   -  -- -- 144<H>  06-28-22 @ 11:27   -  -- -- 172<H>  06-28-22 @ 07:36   -  -- -- 105<H>  06-28-22 @ 07:02   -  -- -- 107<H> --          NEUROIMAGING:      MRI Brain w/wo Gadolinium (6/24/22): Per report,  - Incomplete thin linear enhancement along walls of lateral, third, and fourth ventricles  - Thick linear enhancement of b/l inferior third ventricle walls adjacent to hypothalami and along b/l anterolateral fourth ventricle walls  - 5 mm nodular focus of enhancement involving right hypothalamus and anterolateral wall of third ventricle  - Mild chronic microvascular changes  - Thinning and 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      Austin Birmingham MD   of Neurology  [facility] at [facility]

## 2022-06-29 NOTE — PROGRESS NOTE ADULT - PROBLEM SELECTOR PLAN 8
Patient initially wanting LP outpatient, however patient son Brendan ([phone]) prefers to have patient to be worked up inpatient. Patient and son wants to be primary decision maker. Call son regarding consent and medical decision making.  pending LP tomorrow

## 2022-06-29 NOTE — PROGRESS NOTE ADULT - PROBLEM SELECTOR PLAN 2
- Patient was complaining of abdominal pain, N/V and inability to tolerate PO intake-Improved  - CT abdomen and Pelvis IV contrast didn't' show any abnormalities   - Noted with no difficulty eating and with fair appetite  - Zofran IV PRN for N/V   - Tylenol and Oxycodone PRN for pain - Patient was complaining of abdominal pain, N/V and inability to tolerate PO intake-Improved  - CT abdomen and Pelvis IV contrast didn't' show any abnormalities   - Noted with no difficulty eating and with fair appetite  - Zofran IV PRN for N/V   - Tylenol and Oxycodone PRN for pain  - Started MOM  prn for constipation

## 2022-06-29 NOTE — PROGRESS NOTE ADULT - ASSESSMENT
Patient is 88 year old male with past medical history of diabetes mellitus and hypertension, Vertigo is presenting to the ED with chief complaint of abdominal pain, generalized dizziness, weakness, and unable to tolerate PO for the last 2 days. Last night, Patient came to ED with vomiting, dizziness, and abdominal pain. He was discharged yesterday when he felt better and felt well enough to go home. Today patient spent most of his time in bed and only was able to take a few bites, which he still vomited back out. CT abdomen and pelvis IV contrast didn't show any acute abnormalities. Patient will be admitted to medicine for abdominal pain/ lymphoma work up  Patient PCP Dr. Santhosh Em [phone]. endorse abnormal outpatient brain MRI, will likely require LP with flow cytometry.  Neuro Dr. Birmingham and neurosurgery Dr. Clayton consulted.  No neurosurgery indicated.  Hem/Onc QMA consulted.   6/29-Pt. is scheduled for bedside LP, neuro Dr. Birmingham following. Patient is 88 year old male with past medical history of diabetes mellitus and hypertension, Vertigo is presenting to the ED with chief complaint of abdominal pain, generalized dizziness, weakness, and unable to tolerate PO for the last 2 days. Last night, Patient came to ED with vomiting, dizziness, and abdominal pain. He was discharged yesterday when he felt better and felt well enough to go home. Today patient spent most of his time in bed and only was able to take a few bites, which he still vomited back out. CT abdomen and pelvis IV contrast didn't show any acute abnormalities. Patient will be admitted to medicine for abdominal pain/ lymphoma work up  Patient PCP Dr. Santhosh Em [phone]. endorse abnormal outpatient brain MRI, will likely require LP with flow cytometry.  Neuro Dr. Birmingham and neurosurgery Dr. Clayton consulted.  No neurosurgery indicated.  Hem/Onc QMA consulted.   6/29-Pt. is scheduled for bedside LP, neuro Dr. Birmingham following.  Pt. remains HD stable with occasional c/o abdominal discomfort.    Spoke to pt.'s son Brendan, official HCP form completed and placed in chart, copy given to pt.  Son updated about pt.'s condition and plan of care.  Discharge planning pending LP completion and f/u recommended.

## 2022-06-30 LAB
ANION GAP SERPL CALC-SCNC: 7 MMOL/L — SIGNIFICANT CHANGE UP (ref 5–17)
APPEARANCE CSF: CLEAR — SIGNIFICANT CHANGE UP
APPEARANCE SPUN FLD: ABNORMAL
BUN SERPL-MCNC: 17 MG/DL — SIGNIFICANT CHANGE UP (ref 7–18)
CALCIUM SERPL-MCNC: 9.8 MG/DL — SIGNIFICANT CHANGE UP (ref 8.4–10.5)
CHLORIDE SERPL-SCNC: 97 MMOL/L — SIGNIFICANT CHANGE UP (ref 96–108)
CO2 SERPL-SCNC: 28 MMOL/L — SIGNIFICANT CHANGE UP (ref 22–31)
COLOR CSF: ABNORMAL
CREAT SERPL-MCNC: 1.21 MG/DL — SIGNIFICANT CHANGE UP (ref 0.5–1.3)
EGFR: 58 ML/MIN/1.73M2 — LOW
GLUCOSE BLDC GLUCOMTR-MCNC: 124 MG/DL — HIGH (ref 70–99)
GLUCOSE BLDC GLUCOMTR-MCNC: 135 MG/DL — HIGH (ref 70–99)
GLUCOSE BLDC GLUCOMTR-MCNC: 173 MG/DL — HIGH (ref 70–99)
GLUCOSE BLDC GLUCOMTR-MCNC: 177 MG/DL — HIGH (ref 70–99)
GLUCOSE CSF-MCNC: 69 MG/DL — SIGNIFICANT CHANGE UP (ref 40–70)
GLUCOSE SERPL-MCNC: 135 MG/DL — HIGH (ref 70–99)
HCT VFR BLD CALC: 38.6 % — LOW (ref 39–50)
HGB BLD-MCNC: 12.8 G/DL — LOW (ref 13–17)
LYMPHOCYTES # CSF: 85 % — HIGH (ref 40–80)
MCHC RBC-ENTMCNC: 29.3 PG — SIGNIFICANT CHANGE UP (ref 27–34)
MCHC RBC-ENTMCNC: 33.2 GM/DL — SIGNIFICANT CHANGE UP (ref 32–36)
MCV RBC AUTO: 88.3 FL — SIGNIFICANT CHANGE UP (ref 80–100)
MONOS+MACROS NFR CSF: 14 % — LOW (ref 15–45)
NEUTROPHILS # CSF: 1 % — SIGNIFICANT CHANGE UP (ref 0–6)
NIGHT BLUE STAIN TISS: SIGNIFICANT CHANGE UP
NRBC # BLD: 0 /100 WBCS — SIGNIFICANT CHANGE UP (ref 0–0)
NRBC NFR CSF: 30 /UL — HIGH (ref 0–5)
PLATELET # BLD AUTO: 204 K/UL — SIGNIFICANT CHANGE UP (ref 150–400)
POTASSIUM SERPL-MCNC: 4.2 MMOL/L — SIGNIFICANT CHANGE UP (ref 3.5–5.3)
POTASSIUM SERPL-SCNC: 4.2 MMOL/L — SIGNIFICANT CHANGE UP (ref 3.5–5.3)
PROT CSF-MCNC: 255 MG/DL — HIGH (ref 15–45)
RBC # BLD: 4.37 M/UL — SIGNIFICANT CHANGE UP (ref 4.2–5.8)
RBC # CSF: 184 /UL — HIGH (ref 0–0)
RBC # FLD: 14.6 % — HIGH (ref 10.3–14.5)
SODIUM SERPL-SCNC: 132 MMOL/L — LOW (ref 135–145)
SPECIMEN SOURCE: SIGNIFICANT CHANGE UP
TUBE TYPE: SIGNIFICANT CHANGE UP
WBC # BLD: 12.03 K/UL — HIGH (ref 3.8–10.5)
WBC # FLD AUTO: 12.03 K/UL — HIGH (ref 3.8–10.5)

## 2022-06-30 PROCEDURE — 62328 DX LMBR SPI PNXR W/FLUOR/CT: CPT

## 2022-06-30 PROCEDURE — 99233 SBSQ HOSP IP/OBS HIGH 50: CPT | Mod: FS

## 2022-06-30 PROCEDURE — 99233 SBSQ HOSP IP/OBS HIGH 50: CPT

## 2022-06-30 RX ADMIN — ATORVASTATIN CALCIUM 40 MILLIGRAM(S): 80 TABLET, FILM COATED ORAL at 21:25

## 2022-06-30 RX ADMIN — Medication 2: at 13:02

## 2022-06-30 RX ADMIN — BIMATOPROST 1 DROP(S): 0.3 SOLUTION/ DROPS OPHTHALMIC at 21:25

## 2022-06-30 RX ADMIN — CARVEDILOL PHOSPHATE 12.5 MILLIGRAM(S): 80 CAPSULE, EXTENDED RELEASE ORAL at 06:09

## 2022-06-30 RX ADMIN — CARVEDILOL PHOSPHATE 12.5 MILLIGRAM(S): 80 CAPSULE, EXTENDED RELEASE ORAL at 17:25

## 2022-06-30 NOTE — DIETITIAN INITIAL EVALUATION ADULT - MALNUTRITION
Date of Service: 02/06/2017    Followup:  1.  Essential hypertension.  2.  Diabetes mellitus type 2.  3.  Renal insufficiency.    She is 81 years old, has hypertension, not well controlled.  At the time of visit we discussed the need for better lifestyle and also continued monitoring of the blood pressure.  Has diabetes mellitus type 2 with hypoglycemia.  Due to concomitant renal insufficiency, I recommended to titrate Glimepiride, discontinue Metformin and if the problem persists, we will contemplate to add Insulin.  At this time patient asks to have more time to address her lifestyle and all of the orders contemplated and discussed today will be addressed at the subsequent visit.      No chest pain, no diaphoresis.  Diet still a major concern.    PHYSICAL EXAMINATION:   GENERAL: Pleasant, well groomed.  VITAL SIGNS: In Epic.  SKIN:  No rashes, good skin turgor.  HEENT:  No conjunctival erythema.  LUNGS:  No respiratory distress.  Lungs clear to auscultation.  EXTREMITIES:  No clubbing.  NEUROLOGIC:  Gait and station normal.    ASSESSMENT AND PLAN:  1.  Essential hypertension, continue medical management.  Monitor blood pressure.  2.  Diabetes mellitus type 2 with hypoglycemia, at least partially related to serious noncompliance with diet and exercise.  3.  Renal insufficiency.  See discussion above.  We will monitor and consider all of the above recommendations.  The patient is to follow up with me in 3-6 months and in April after blood work.      Dictated By: Jane Tai MD  Signing Provider: Jane Tai MD    MS/RHONDA ([id number])  DD: 02/16/2017 12:34:03 TD: 02/17/2017 11:00:40    Copy Sent To:    Moderate Malnutrition in Acute on Chronic Illness

## 2022-06-30 NOTE — PROGRESS NOTE ADULT - ASSESSMENT
complete note to follow     Assessment and Plan:   · Assessment	    88 year old male with past medical history of diabetes mellitus and hypertension, Vertigo is presenting to the ED with chief complaint of abdominal pain, generalized dizziness, weakness, and unable to tolerate PO for the last 2 days. Last night, Patient came to ED with vomiting, dizziness, and abdominal pain. He was discharged yesterday when he felt better and felt well enough to go home. Today patient spent most of his time in bed and only was able to take a few bites, which he still vomited back out, However, Patient feels much better in ED by the time of examination and was asking if he can go home. Patient denied any chest pain, change in bowel movement.    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 4mm and 6mm nodular enhancements left lateral ventricle body and atrium. 5mm nodular focus Right hypothalamus  *Brain MRI PACS imaging is available on pt's cell phone  CT A/P (-)  LDH, ESR are wnl  Chest CT shows subcm B/L pulm nodules  Called and spoke with Dr. Mak he advised that pt should have LP with flow during this admission, discussed with pt and Dr. Em who also agrees with procedure, pt reviewed with his son and now agree  Neurology consult appreciated, rec LP with Flow  NeuroSx consult, no Sx indicated  Rec's:  -Plan for LP today and possible d/c tomorrow  -upon discharge pt to f/u with primary Oncologist Dr. Mak      above discussed with Pt's son Brendan [phone]    Thank you for the referral. Will continue to monitor the patient.  Please call with any questions [phone]  Above reviewed with Attending Dr. Galvez  [facility]  [address], [address], [place], [place]  [phone]  *Note not finalized until signed by Attending Physician

## 2022-06-30 NOTE — PROGRESS NOTE ADULT - PROBLEM/PLAN-5
DISPLAY PLAN FREE TEXT
DISPLAY PLAN FREE TEXT
no dermatitis, no environmental allergies, no food allergies, no immunosuppressive disorder, and no pruritus.
DISPLAY PLAN FREE TEXT
DISPLAY PLAN FREE TEXT

## 2022-06-30 NOTE — PROGRESS NOTE ADULT - PROVIDER SPECIALTY LIST ADULT
Internal Medicine
Neurology
Heme/Onc
Neurology
Heme/Onc
Neurology
Internal Medicine

## 2022-06-30 NOTE — PROGRESS NOTE ADULT - TIME BILLING
I counseled the patient and family about the appropriate testing to help confirm the patient's diagnosis and guide further treatment.
I counseled the patient and primary team about the patient's preliminary CSF test results.
I counseled the patient and family about the appropriate testing to help confirm the patient's diagnosis and guide further treatment.

## 2022-06-30 NOTE — DIETITIAN INITIAL EVALUATION ADULT - PERTINENT MEDS FT
MEDICATIONS  (STANDING):  atorvastatin 40 milliGRAM(s) Oral at bedtime  bimatoprost 0.01% Ophthalmic Solution 1 Drop(s) Both EYES at bedtime  carvedilol 12.5 milliGRAM(s) Oral every 12 hours  insulin lispro (ADMELOG) corrective regimen sliding scale   SubCutaneous three times a day before meals  Rhopressa 0.02% eye drops 1 Drop(s) 1 Drop(s) Both EYES at bedtime    MEDICATIONS  (PRN):  acetaminophen     Tablet .. 650 milliGRAM(s) Oral every 6 hours PRN Mild Pain (1 - 3)  magnesium hydroxide Suspension 30 milliLiter(s) Oral daily PRN Constipation  meclizine 25 milliGRAM(s) Oral three times a day PRN Dizziness  ondansetron Injectable 4 milliGRAM(s) IV Push every 8 hours PRN Nausea and/or Vomiting

## 2022-06-30 NOTE — DIETITIAN INITIAL EVALUATION ADULT - PERTINENT LABORATORY DATA
06-30    132<L>  |  97  |  17  ----------------------------<  135<H>  4.2   |  28  |  1.21    Ca    9.8      30 Jun 2022 07:44    POCT Blood Glucose.: 177 mg/dL (06-30-22 @ 11:47)

## 2022-06-30 NOTE — PROGRESS NOTE ADULT - PROBLEM SELECTOR PLAN 8
s/p IR LP 6/30, tolerated well  Pt. is medically stable for discharge to home with home PT  Pt. to f/u with OP hem/onc and neuro

## 2022-06-30 NOTE — PROGRESS NOTE ADULT - NUTRITIONAL ASSESSMENT
This patient has been assessed with a concern for Malnutrition and has been determined to have a diagnosis/diagnoses of Moderate protein-calorie malnutrition.    This patient is being managed with:   Diet DASH/TLC-  Sodium & Cholesterol Restricted  Consistent Carbohydrate {Evening Snacks}  Supplement Feeding Modality:  Oral  Glucerna Shake Cans or Servings Per Day:  1       Frequency:  Three Times a day  Entered: Jun 29 2022  5:16PM

## 2022-06-30 NOTE — PROGRESS NOTE ADULT - SUBJECTIVE AND OBJECTIVE BOX
NP Note discussed with  Primary Attending    Patient is a 88y old  Male who presents with a chief complaint of Abdominal Pain (30 Jun 2022 13:28)      INTERVAL HPI/OVERNIGHT EVENTS: no new complaints    MEDICATIONS  (STANDING):  atorvastatin 40 milliGRAM(s) Oral at bedtime  bimatoprost 0.01% Ophthalmic Solution 1 Drop(s) Both EYES at bedtime  carvedilol 12.5 milliGRAM(s) Oral every 12 hours  insulin lispro (ADMELOG) corrective regimen sliding scale   SubCutaneous three times a day before meals  Rhopressa 0.02% eye drops 1 Drop(s) 1 Drop(s) Both EYES at bedtime    MEDICATIONS  (PRN):  acetaminophen     Tablet .. 650 milliGRAM(s) Oral every 6 hours PRN Mild Pain (1 - 3)  magnesium hydroxide Suspension 30 milliLiter(s) Oral daily PRN Constipation  meclizine 25 milliGRAM(s) Oral three times a day PRN Dizziness  ondansetron Injectable 4 milliGRAM(s) IV Push every 8 hours PRN Nausea and/or Vomiting      __________________________________________________  REVIEW OF SYSTEMS:    CONSTITUTIONAL: No fever,   EYES: no acute visual disturbances  NECK: No pain or stiffness  RESPIRATORY: No cough; No shortness of breath  CARDIOVASCULAR: No chest pain, no palpitations  GASTROINTESTINAL: No pain. No nausea or vomiting; No diarrhea   NEUROLOGICAL: No headache or numbness, no tremors  MUSCULOSKELETAL: No joint pain, no muscle pain  GENITOURINARY: no dysuria, no frequency, no hesitancy  PSYCHIATRY: no depression , no anxiety  ALL OTHER  ROS negative        Vital Signs Last 24 Hrs  T(C): 36.2 (30 Jun 2022 17:20), Max: 36.7 (30 Jun 2022 14:32)  T(F): 97.2 (30 Jun 2022 17:20), Max: 98 (30 Jun 2022 14:32)  HR: 61 (30 Jun 2022 17:20) (61 - 76)  BP: 142/71 (30 Jun 2022 17:20) (104/66 - 148/79)  BP(mean): 88 (30 Jun 2022 17:20) (88 - 88)  RR: 18 (30 Jun 2022 17:20) (18 - 18)  SpO2: 100% (30 Jun 2022 17:20) (96% - 100%)    ________________________________________________  PHYSICAL EXAM:  well developed  GENERAL: NAD  HEENT: Normocephalic;  conjunctivae and sclerae clear; moist mucous membranes;   NECK : supple  CHEST/LUNG: Clear to auscultation bilaterally with good air entry   HEART: S1 S2  regular; no murmurs, gallops or rubs  ABDOMEN: Soft, Nontender, Nondistended; Bowel sounds present  EXTREMITIES: no cyanosis; no edema; no calf tenderness  SKIN: warm and dry; no rash  NERVOUS SYSTEM:  Awake and alert; Oriented  to place, person and time ; no new deficits    _________________________________________________  LABS:                        12.8   12.03 )-----------( 204      ( 30 Jun 2022 07:44 )             38.6     06-30    132<L>  |  97  |  17  ----------------------------<  135<H>  4.2   |  28  |  1.21    Ca    9.8      30 Jun 2022 07:44      PT/INR - ( 29 Jun 2022 10:44 )   PT: 11.9 sec;   INR: 1.00 ratio         PTT - ( 29 Jun 2022 10:44 )  PTT:31.4 sec    CAPILLARY BLOOD GLUCOSE      POCT Blood Glucose.: 124 mg/dL (30 Jun 2022 16:54)  POCT Blood Glucose.: 177 mg/dL (30 Jun 2022 11:47)  POCT Blood Glucose.: 135 mg/dL (30 Jun 2022 07:44)  POCT Blood Glucose.: 158 mg/dL (29 Jun 2022 21:17)        RADIOLOGY & ADDITIONAL TESTS:    < from: Xray Fluoro Guided Needle Placement (06.30.22 @ 15:59) >    ACC: [id number] EXAM:  XR FLUORO GUID NDL PLC                          PROCEDURE DATE:  06/30/2022          INTERPRETATION:  Lumbar puncture under fluoroscopy    Indication: Lymphoma. Possible leptomeningeal disease.    Procedure:    Risks and benefits of the procedure were discussed with the patient.   Written informed consent was acquired. The patient was positioned prone   on the fluoroscopy table.  The lower back was prepped and draped in the   usual sterile fashion.  1% lidocaine was used for local anesthesia.    A 22  gauge needle was advanced under fluoroscopic guidance into the   subarachnoid space at L2-3. Approximately 13 mL clear cerebrospinal fluid   was collected and sent for analysis. No immediate complication was noted.    Impression: Lumbar puncture under fluoroscopy, as above.    < end of copied text >    < from: CT Chest No Cont (06.28.22 @ 10:21) >    ACC: [id number] EXAM:  CT CHEST                          PROCEDURE DATE:  06/28/2022          INTERPRETATION:  Clinical indication: Lymphoma.    Axial CT images of the chest are obtained without intravenous   administration of contrast.    No prior chest CTs are available for comparison.    No enlarged axillary, mediastinal or hilar lymph nodes. Subcentimeter   small nonspecific mediastinal lymph nodes are noted.    Cardiomegaly. Trace pericardial fluid. Vascular calcifications with   involvementof the aorta and the coronary arteries. Trace right pleural   fluid. There may be minimal left pleural fluid. Bilateral gynecomastia.   Left anterior chest wall subcutaneous tissue cardiac loop recorder.    For complete evaluation of the abdomen, please refer to dedicated   recently performed contrast-enhanced abdominal CT of June 24, 2022.    Evaluation of the lungs demonstrate mild bilateral lower lung areas of   linear or subsegmental atelectasis within the lower lobes. A few   scattered bilateral nonspecific pulmonary nodules or nodular opacities   largest associated with the left fissure measuring about 4 mm. Reference   3 mm right lower lobe pulmonary nodule on image 101 of series 3.    2 mm calcified granulomas.    No pneumonia. No central endobronchial lesions.    Degenerative changes of the spine. Spinal scoliosis. 7 mm sclerotic focus   involving the inferior aspect of the T11 vertebral body likely a bone   island.    IMPRESSION: No intrathoracic lymphadenopathy.    Scattered bilateral small pulmonary nodules measuring up to 4 mm,   nonspecific finding. Follow-up noncontrast chest CT can be performed to   ensure stability.    --- End of Report ---    < end of copied text >      Imaging Personally Reviewed:  YES/NO    Consultant(s) Notes Reviewed:   YES/ No    Care Discussed with Consultants :     Plan of care was discussed with patient and /or primary care giver; all questions and concerns were addressed and care was aligned with patient's wishes.

## 2022-06-30 NOTE — PROGRESS NOTE ADULT - SUBJECTIVE AND OBJECTIVE BOX
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 Once resulted, these reports can be forwarded to the patient's oncologist to help guide potential treatment for CNS lymphoma or other neoplastic process.    - Continue PT/OT and walker to assist with ambulation    - DVT ppx: SCDs, Heparin    - Routine follow-up with the patient's established oncologist and neurologist            NOTE TO BE COMPLETED - PLEASE REFER TO ABOVE ONLY AND IGNORE INFORMATION BELOW    ******************************    HPI:  Patient is 88 year old male with past medical history of diabetes mellitus and hypertension, Vertigo is presenting to the ED with chief complaint of abdominal pain, generalized dizziness, weakness, and unable to tolerate PO for the last 2 days. Last night, Patient came to ED with vomiting, dizziness, and abdominal pain. He was discharged yesterday when he felt better and felt well enough to go home. Today patient spent most of his time in bed and only was able to take a few bites, which he still vomited back out, However, Patient feels much better in ED by the time of examination and was asking if he can go home. Patient denied any chest pain, change in bowel movement. (26 Jun 2022 02:11)      NEURO HPI:      INTERVAL HISTORY:      MEDICATIONS:  acetaminophen     Tablet .. 650 milliGRAM(s) Oral every 6 hours PRN  atorvastatin 40 milliGRAM(s) Oral at bedtime  bimatoprost 0.01% Ophthalmic Solution 1 Drop(s) Both EYES at bedtime  carvedilol 12.5 milliGRAM(s) Oral every 12 hours  insulin lispro (ADMELOG) corrective regimen sliding scale   SubCutaneous three times a day before meals  magnesium hydroxide Suspension 30 milliLiter(s) Oral daily PRN  meclizine 25 milliGRAM(s) Oral three times a day PRN  ondansetron Injectable 4 milliGRAM(s) IV Push every 8 hours PRN  Rhopressa 0.02% eye drops 1 Drop(s) 1 Drop(s) Both EYES at bedtime      ALLERGIES:  No Known Allergies      REVIEW OF SYSTEMS:  Fourteen systems reviewed and negative except as in HPI / Interval History.        OBJECTIVE:  Vital Signs Last 24 Hrs  T(C): 36.8 (30 Jun 2022 20:40), Max: 36.8 (30 Jun 2022 20:40)  T(F): 98.2 (30 Jun 2022 20:40), Max: 98.2 (30 Jun 2022 20:40)  HR: 67 (30 Jun 2022 20:40) (61 - 76)  BP: 126/68 (30 Jun 2022 20:40) (104/66 - 148/79)  BP(mean): 82 (30 Jun 2022 20:40) (82 - 88)  RR: 18 (30 Jun 2022 20:40) (18 - 18)  SpO2: 98% (30 Jun 2022 20:40) (96% - 100%)    General Examination:  General: No acute distress  HEENT: Atraumatic, Normocephalic  Respiratory: CTA B/l.  No crackles, rhonchi, or wheezes.  Cardiovascular: RRR.  Normal S1 & S2.  Normal b/l radial and pedal pulses.    Neurological Examination:  General / Mental Status: AAO x 3.  No aphasia or dysarthria.  Naming and repetition intact.  Cranial Nerves: VFF x 4.  PERRL.  EOMI x 2, No nystagmus or diplopia.  B/l V1-V3 equal and intact to light touch and pinprick.  Symmetric facial movement and palate elevation.  B/l hearing equal to finger rub.  5/5 strength with b/l sternocleidomastoid & trapezius.  Midline tongue protrusion, with no atrophy or fasciculations.  Motor: Normal bulk & tone in all four extremities.  5/5 strength throughout all four extremities.  No downward drift, rigidity, spasticity, or tremors in any of the four extremities.  Sensory: Intact to light touch and pinprick in all four extremities.  Negative Romberg.  Reflex: 2+ and symmetric at b/l biceps, triceps, brachioradialis, patellae, and ankles.  Downgoing toes b/l.  Coordination: No dysmetria with b/l finger-to-nose and heel raise tests.  Symmetric rapid alternating movements b/l.  Gait: Normal, narrow-based gait.  No difficulty with tiptoe, heel, and tandem gaits.        LABORATORY VALUES:                          12.8   12.03 )-----------( 204      ( 30 Jun 2022 07:44 )             38.6       06-30    132<L>  |  97  |  17  ----------------------------<  135<H>  4.2   |  28  |  1.21    Ca    9.8      30 Jun 2022 07:44                Glucose Trend  06-30-22 @ 21:06   -  -- -- 173<H>  06-30-22 @ 16:54   -  -- -- 124<H>  06-30-22 @ 11:47   -  -- -- 177<H>  06-30-22 @ 07:44   -  -- 135<H> 135<H>  06-29-22 @ 21:17   -  -- -- 158<H>  06-29-22 @ 16:35   -  -- -- 142<H>  06-29-22 @ 11:28   -  -- -- 143<H>  06-29-22 @ 10:44   -  -- 163<H> --  06-29-22 @ 07:43   -  -- -- 123<H>  06-28-22 @ 21:17   -  -- -- 196<H>                    NEUROIMAGING:          Please contact the Neurology consult service with any neurological questions.      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 Once resulted, these reports can be forwarded to the patient's oncologist to help guide potential treatment for CNS lymphoma or other neoplastic process.    - Continue PT/OT and walker to assist with ambulation    - DVT ppx: SCDs, Heparin    - Routine follow-up with the patient's established oncologist and neurologist        ******************************    HPI:  Patient is 88 year old male with past medical history of diabetes mellitus and hypertension, Vertigo is presenting to the ED with chief complaint of abdominal pain, generalized dizziness, weakness, and unable to tolerate PO for the last 2 days. Last night, Patient came to ED with vomiting, dizziness, and abdominal pain. He was discharged yesterday when he felt better and felt well enough to go home. Today patient spent most of his time in bed and only was able to take a few bites, which he still vomited back out, However, Patient feels much better in ED by the time of examination and was asking if he can go home. Patient denied any chest pain, change in bowel movement. 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      INTERVAL HISTORY:  The patient has not developed any new neurological deficits overnight. He has tolerated his lumbar puncture without complications.      MEDICATIONS:  acetaminophen     Tablet .. 650 milliGRAM(s) Oral every 6 hours PRN  atorvastatin 40 milliGRAM(s) Oral at bedtime  bimatoprost 0.01% Ophthalmic Solution 1 Drop(s) Both EYES at bedtime  carvedilol 12.5 milliGRAM(s) Oral every 12 hours  insulin lispro (ADMELOG) corrective regimen sliding scale   SubCutaneous three times a day before meals  magnesium hydroxide Suspension 30 milliLiter(s) Oral daily PRN  meclizine 25 milliGRAM(s) Oral three times a day PRN  ondansetron Injectable 4 milliGRAM(s) IV Push every 8 hours PRN  Rhopressa 0.02% eye drops 1 Drop(s) 1 Drop(s) Both EYES at bedtime      ALLERGIES:  No Known Allergies      REVIEW OF SYSTEMS:  Fourteen systems reviewed and negative except as in HPI / Interval History.        OBJECTIVE:  Vital Signs Last 24 Hrs  T(C): 36.8 (30 Jun 2022 20:40), Max: 36.8 (30 Jun 2022 20:40)  T(F): 98.2 (30 Jun 2022 20:40), Max: 98.2 (30 Jun 2022 20:40)  HR: 67 (30 Jun 2022 20:40) (61 - 76)  BP: 126/68 (30 Jun 2022 20:40) (104/66 - 148/79)  BP(mean): 82 (30 Jun 2022 20:40) (82 - 88)  RR: 18 (30 Jun 2022 20:40) (18 - 18)  SpO2: 98% (30 Jun 2022 20:40) (96% - 100%)    General Examination:  General: No acute distress  HEENT: Atraumatic, Normocephalic  Respiratory: CTA B/l.  No crackles, rhonchi, or wheezes.  Cardiovascular: RRR.  Normal S1 & S2.  Normal b/l radial and pedal pulses.    Neurological Examination:  General / Mental Status: AAO x 3.  No aphasia or dysarthria.  Immediate recall: 3/3, 5-minute delayed recall: 2/3.  Cranial Nerves: VFF x 4.  PERRL.  EOMI x 2, No nystagmus or diplopia.  B/l V1-V3 equal and intact to light touch and pinprick.  Symmetric facial movement and palate elevation.  B/l hearing equal to finger rub.  5/5 strength with b/l sternocleidomastoid & trapezius.  Midline tongue protrusion, with no atrophy or fasciculations.  Motor: Normal bulk & tone in all four extremities.  5/5 strength throughout b/l upper extremities, with b/l hand  strength equal.  At least 4/5 strength throughout b/l lower extremities.  No downward drift, rigidity, spasticity, or tremors in any of the four extremities.  Sensory: Intact to light touch and pinprick in all four extremities.  Reflex: 1+ and symmetric at b/l biceps, triceps, brachioradialis, patellae, and ankles.  Mute toes b/l.  Coordination: No dysmetria with b/l finger-to-nose and heel raise tests.  Gait and Romberg sign testing deferred per patient request.          LABORATORY VALUES:                          12.8   12.03 )-----------( 204      ( 30 Jun 2022 07:44 )             38.6       06-30    132<L>  |  97  |  17  ----------------------------<  135<H>  4.2   |  28  |  1.21    Ca    9.8      30 Jun 2022 07:44      Glucose Trend  06-30-22 @ 21:06   -  -- -- 173<H>  06-30-22 @ 16:54   -  -- -- 124<H>  06-30-22 @ 11:47   -  -- -- 177<H>  06-30-22 @ 07:44   -  -- 135<H> 135<H>  06-29-22 @ 21:17   -  -- -- 158<H>  06-29-22 @ 16:35   -  -- -- 142<H>  06-29-22 @ 11:28   -  -- -- 143<H>  06-29-22 @ 10:44   -  -- 163<H> --  06-29-22 @ 07:43   -  -- -- 123<H>  06-28-22 @ 21:17   -  -- -- 196<H>      CSF Labs (6/30/22):  - Appearance/Color: Clear, Xanthochromia  - Cell Count 30 <H>, RBC Count 184 <H>, Lymphocytes 85% <H>  - Glucose 69, Protein 255 <H>  - Albumin 142.9 <H> (Serum Albumin 3187 <L>)  - IgG Synthesis 144.9 <H>  - Gram stain: Negative  - Bacterial culture, AFB Culture, Cytology, Flow Cytometry: Pending          NEUROIMAGING:      MRI Brain w/wo Gadolinium (6/24/22): Per report,  - Incomplete thin linear enhancement along walls of lateral, third, and fourth ventricles  - Thick linear enhancement of b/l inferior third ventricle walls adjacent to hypothalami and along b/l anterolateral fourth ventricle walls  - 5 mm nodular focus of enhancement involving right hypothalamus and anterolateral wall of third ventricle  - Mild chronic microvascular changes  - Thinning and 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 Once resulted, these reports can be forwarded to the patient's oncologist to help guide potential treatment for CNS lymphoma or other neoplastic process.    - Continue PT/OT and walker to assist with ambulation    - DVT ppx: SCDs, Heparin    - Routine follow-up with the patient's established oncologist and neurologist        ******************************    HPI:  Patient is 88 year old male with past medical history of diabetes mellitus and hypertension, Vertigo is presenting to the ED with chief complaint of abdominal pain, generalized dizziness, weakness, and unable to tolerate PO for the last 2 days. Last night, Patient came to ED with vomiting, dizziness, and abdominal pain. He was discharged yesterday when he felt better and felt well enough to go home. Today patient spent most of his time in bed and only was able to take a few bites, which he still vomited back out, However, Patient feels much better in ED by the time of examination and was asking if he can go home. Patient denied any chest pain, change in bowel movement. 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      INTERVAL HISTORY:  The patient has not developed any new neurological deficits overnight. He has tolerated his lumbar puncture without complications.      MEDICATIONS:  acetaminophen     Tablet .. 650 milliGRAM(s) Oral every 6 hours PRN  atorvastatin 40 milliGRAM(s) Oral at bedtime  bimatoprost 0.01% Ophthalmic Solution 1 Drop(s) Both EYES at bedtime  carvedilol 12.5 milliGRAM(s) Oral every 12 hours  insulin lispro (ADMELOG) corrective regimen sliding scale   SubCutaneous three times a day before meals  magnesium hydroxide Suspension 30 milliLiter(s) Oral daily PRN  meclizine 25 milliGRAM(s) Oral three times a day PRN  ondansetron Injectable 4 milliGRAM(s) IV Push every 8 hours PRN  Rhopressa 0.02% eye drops 1 Drop(s) 1 Drop(s) Both EYES at bedtime      ALLERGIES:  No Known Allergies      REVIEW OF SYSTEMS:  Fourteen systems reviewed and negative except as in HPI / Interval History.        OBJECTIVE:  Vital Signs Last 24 Hrs  T(C): 36.8 (30 Jun 2022 20:40), Max: 36.8 (30 Jun 2022 20:40)  T(F): 98.2 (30 Jun 2022 20:40), Max: 98.2 (30 Jun 2022 20:40)  HR: 67 (30 Jun 2022 20:40) (61 - 76)  BP: 126/68 (30 Jun 2022 20:40) (104/66 - 148/79)  BP(mean): 82 (30 Jun 2022 20:40) (82 - 88)  RR: 18 (30 Jun 2022 20:40) (18 - 18)  SpO2: 98% (30 Jun 2022 20:40) (96% - 100%)    General Examination:  General: No acute distress  HEENT: Atraumatic, Normocephalic  Respiratory: CTA B/l.  No crackles, rhonchi, or wheezes.  Cardiovascular: RRR.  Normal S1 & S2.  Normal b/l radial and pedal pulses.    Neurological Examination:  General / Mental Status: AAO x 3.  No aphasia or dysarthria.  Immediate recall: 3/3, 5-minute delayed recall: 2/3.  Cranial Nerves: VFF x 4.  PERRL.  EOMI x 2, No nystagmus or diplopia.  B/l V1-V3 equal and intact to light touch and pinprick.  Symmetric facial movement and palate elevation.  B/l hearing equal to finger rub.  5/5 strength with b/l sternocleidomastoid & trapezius.  Midline tongue protrusion, with no atrophy or fasciculations.  Motor: Normal bulk & tone in all four extremities.  5/5 strength throughout b/l upper extremities, with b/l hand  strength equal.  At least 4/5 strength throughout b/l lower extremities.  No downward drift, rigidity, spasticity, or tremors in any of the four extremities.  Sensory: Intact to light touch and pinprick in all four extremities.  Reflex: 1+ and symmetric at b/l biceps, triceps, brachioradialis, patellae, and ankles.  Mute toes b/l.  Coordination: No dysmetria with b/l finger-to-nose and heel raise tests.  Gait and Romberg sign testing deferred per patient request.          LABORATORY VALUES:                          12.8   12.03 )-----------( 204      ( 30 Jun 2022 07:44 )             38.6       06-30    132<L>  |  97  |  17  ----------------------------<  135<H>  4.2   |  28  |  1.21    Ca    9.8      30 Jun 2022 07:44      Glucose Trend  06-30-22 @ 21:06   -  -- -- 173<H>  06-30-22 @ 16:54   -  -- -- 124<H>  06-30-22 @ 11:47   -  -- -- 177<H>  06-30-22 @ 07:44   -  -- 135<H> 135<H>  06-29-22 @ 21:17   -  -- -- 158<H>  06-29-22 @ 16:35   -  -- -- 142<H>  06-29-22 @ 11:28   -  -- -- 143<H>  06-29-22 @ 10:44   -  -- 163<H> --  06-29-22 @ 07:43   -  -- -- 123<H>  06-28-22 @ 21:17   -  -- -- 196<H>      CSF Labs (6/30/22):  - Appearance/Color: Clear, Xanthochromia  - Cell Count 30 <H>, RBC Count 184 <H>, Lymphocytes 85% <H>  - Glucose 69, Protein 255 <H>  - Albumin 142.9 <H> (Serum Albumin 3187 <L>)  - IgG Synthesis 144.9 <H>  - HSV 1/2 PCR: Not detected  - Gram stain: Negative  - Bacterial culture, AFB Culture, Cytology, Flow Cytometry: Pending          NEUROIMAGING:      MRI Brain w/wo Gadolinium (6/24/22): Per report,  - Incomplete thin linear enhancement along walls of lateral, third, and fourth ventricles  - Thick linear enhancement of b/l inferior third ventricle walls adjacent to hypothalami and along b/l anterolateral fourth ventricle walls  - 5 mm nodular focus of enhancement involving right hypothalamus and anterolateral wall of third ventricle  - Mild chronic microvascular changes  - Thinning and 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      Austin Birmingham MD   of Neurology  [facility] at [facility]

## 2022-06-30 NOTE — DIETITIAN NUTRITION RISK NOTIFICATION - TREATMENT: THE FOLLOWING DIET HAS BEEN RECOMMENDED
Diet, DASH/TLC:   Sodium & Cholesterol Restricted  Consistent Carbohydrate {Evening Snacks}  Supplement Feeding Modality:  Oral  Glucerna Shake Cans or Servings Per Day:  1       Frequency:  Three Times a day (06-29-22 @ 17:16) [Active]

## 2022-06-30 NOTE — PROGRESS NOTE ADULT - ASSESSMENT
Patient is 88 year old male with past medical history of diabetes mellitus and hypertension, Vertigo is presenting to the ED with chief complaint of abdominal pain, generalized dizziness, weakness, and unable to tolerate PO for the last 2 days. Last night, Patient came to ED with vomiting, dizziness, and abdominal pain. He was discharged yesterday when he felt better and felt well enough to go home. Today patient spent most of his time in bed and only was able to take a few bites, which he still vomited back out. CT abdomen and pelvis IV contrast didn't show any acute abnormalities. Patient will be admitted to medicine for abdominal pain/ lymphoma work up  Patient PCP Dr. Santhosh Em [phone]. endorse abnormal outpatient brain MRI, will likely require LP with flow cytometry.  Neuro Dr. Birmingham and neurosurgery Dr. Clayton consulted.  No neurosurgery indicated.  Hem/Onc QMA consulted.   6/29-Pt. is scheduled for bedside LP, neuro Dr. Birmingham following.  Pt. remains HD stable with occasional c/o abdominal discomfort.    Spoke to pt.'s son Brendan, official HCP form completed and placed in chart, copy given to pt.  Son updated about pt.'s condition and plan of care.  Discharge planning pending LP completion and f/u recommended.  6/30-Underwent IR LP, tolerated procedure well. Will likely discharge to home tomorrow to f/u with outpatient hem/onc and neurology.

## 2022-06-30 NOTE — PROGRESS NOTE ADULT - PROBLEM SELECTOR PLAN 3
-Has vertigo for 3 years   - On Meclizine at home; Will continue  - Reports current dizziness is different than his vertigo

## 2022-06-30 NOTE — PROGRESS NOTE ADULT - PROBLEM SELECTOR PLAN 2
- Patient was complaining of abdominal pain, N/V and inability to tolerate PO intake-Improved  - CT abdomen and Pelvis IV contrast didn't' show any abnormalities   - Noted with no difficulty eating and with fair appetite  - Zofran IV PRN for N/V   - Tylenol and Oxycodone PRN for pain  - Started MOM  prn for constipation

## 2022-06-30 NOTE — PROGRESS NOTE ADULT - REASON FOR ADMISSION
Abdominal Pain

## 2022-06-30 NOTE — PROGRESS NOTE ADULT - NS ATTEND AMEND GEN_ALL_CORE FT
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 Abd exam benign and no further nausea/vomiting. Tolerating diet, encouraged patient to take his meals.   -Pt with known hx of  lymphoma,  follows with Dr. Mak at [facility]. He had recent out-patient MR head performed which showed  6/24/22, shows linear and nodular enhancement of the ependymal lining of the ventricular system susp for malignancy. 4mm and 6mm nodular enhancements left lateral ventricle body and atrium. 5mm nodular focus Right hypothalamus.   - pending LP w/ flow cytometry requested for workup of these new MRI findings by outpaitent oncologist request   - patient refused LP yesterday, willing to try again today with Dr. Birmingham   - appreciate neuro consult  - appreciate heme-onc consult  - heparin on hold   - PT recommending home with PT, patient can be discharged home tomorrow after

## 2022-06-30 NOTE — PROGRESS NOTE ADULT - PROBLEM SELECTOR PLAN 1
Pt. reports increased weakness, dizziness and unsteady gait  -OP brain MRI abnormal as reported by pt.'s PCP Dr. Em  -Neuro Dr. Birmingham following  -Hem/onc QMA following  -Neurosurgery Dr. Clayton consulted-no surgical indication  -Underwent IR LP today (6/30)  -Pt. to f/u with OP hem/onc and neuro

## 2022-06-30 NOTE — DIETITIAN INITIAL EVALUATION ADULT - NS FNS DIET ORDER
Diet, DASH/TLC:   Sodium & Cholesterol Restricted  Consistent Carbohydrate {Evening Snacks}  Supplement Feeding Modality:  Oral  Glucerna Shake Cans or Servings Per Day:  1       Frequency:  Three Times a day (06-29-22 @ 17:16)

## 2022-06-30 NOTE — PROGRESS NOTE ADULT - PROBLEM SELECTOR PLAN 5
- Sliding scale   - DASH diet

## 2022-06-30 NOTE — PROGRESS NOTE ADULT - SUBJECTIVE AND OBJECTIVE BOX
Patient is a 88y old  Male who presents with a chief complaint of Vomiting      SUBJECTIVE / OVERNIGHT EVENTS:    MEDICATIONS  (STANDING):  atorvastatin 40 milliGRAM(s) Oral at bedtime  bimatoprost 0.01% Ophthalmic Solution 1 Drop(s) Both EYES at bedtime  carvedilol 12.5 milliGRAM(s) Oral every 12 hours  insulin lispro (ADMELOG) corrective regimen sliding scale   SubCutaneous three times a day before meals  Rhopressa 0.02% eye drops 1 Drop(s) 1 Drop(s) Both EYES at bedtime    MEDICATIONS  (PRN):  acetaminophen     Tablet .. 650 milliGRAM(s) Oral every 6 hours PRN Mild Pain (1 - 3)  magnesium hydroxide Suspension 30 milliLiter(s) Oral daily PRN Constipation  meclizine 25 milliGRAM(s) Oral three times a day PRN Dizziness  ondansetron Injectable 4 milliGRAM(s) IV Push every 8 hours PRN Nausea and/or Vomiting        PHYSICAL EXAM:  Vital Signs Last 24 Hrs  T(C): 36.2 (30 Jun 2022 05:00), Max: 36.7 (29 Jun 2022 17:17)  T(F): 97.2 (30 Jun 2022 05:00), Max: 98 (29 Jun 2022 17:17)  HR: 63 (30 Jun 2022 06:08) (62 - 78)  BP: 148/79 (30 Jun 2022 06:08) (122/73 - 148/79)  BP(mean): --  RR: 18 (30 Jun 2022 05:00) (17 - 18)  SpO2: 96% (30 Jun 2022 05:00) (96% - 98%)      LABS:                        12.8   12.03 )-----------( 204      ( 30 Jun 2022 07:44 )             38.6     06-30    132<L>  |  97  |  17  ----------------------------<  135<H>  4.2   |  28  |  1.21    Ca    9.8      30 Jun 2022 07:44      PT/INR - ( 29 Jun 2022 10:44 )   PT: 11.9 sec;   INR: 1.00 ratio         PTT - ( 29 Jun 2022 10:44 )  PTT:31.4 sec          COVID-19 PCR: NotDetec (25 Jun 2022 22:28)  COVID-19 PCR: NotDetec (24 Jun 2022 17:54)           Patient is a 88y old  Male who presents with a chief complaint of Vomiting      SUBJECTIVE / OVERNIGHT EVENTS: events noted. No new complaints. Denies dizziness, H/A, change in vision. Admits continued dizziness only with ambulation    MEDICATIONS  (STANDING):  atorvastatin 40 milliGRAM(s) Oral at bedtime  bimatoprost 0.01% Ophthalmic Solution 1 Drop(s) Both EYES at bedtime  carvedilol 12.5 milliGRAM(s) Oral every 12 hours  insulin lispro (ADMELOG) corrective regimen sliding scale   SubCutaneous three times a day before meals  Rhopressa 0.02% eye drops 1 Drop(s) 1 Drop(s) Both EYES at bedtime    MEDICATIONS  (PRN):  acetaminophen     Tablet .. 650 milliGRAM(s) Oral every 6 hours PRN Mild Pain (1 - 3)  magnesium hydroxide Suspension 30 milliLiter(s) Oral daily PRN Constipation  meclizine 25 milliGRAM(s) Oral three times a day PRN Dizziness  ondansetron Injectable 4 milliGRAM(s) IV Push every 8 hours PRN Nausea and/or Vomiting        PHYSICAL EXAM:  Vital Signs Last 24 Hrs  T(C): 36.2 (30 Jun 2022 05:00), Max: 36.7 (29 Jun 2022 17:17)  T(F): 97.2 (30 Jun 2022 05:00), Max: 98 (29 Jun 2022 17:17)  HR: 63 (30 Jun 2022 06:08) (62 - 78)  BP: 148/79 (30 Jun 2022 06:08) (122/73 - 148/79)  BP(mean): --  RR: 18 (30 Jun 2022 05:00) (17 - 18)  SpO2: 96% (30 Jun 2022 05:00) (96% - 98%)    GEN: NAD; A and O x 3, 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), OOB to chair  LUNGS: CTA B/L  HEART: S1 S2  ABDOMEN: soft, non-tender, non-distended, + BS  EXTREMITIES: no edema  NERVOUS SYSTEM:  Awake and alert; no focal neuro deficits        LABS:                        12.8   12.03 )-----------( 204      ( 30 Jun 2022 07:44 )             38.6     06-30    132<L>  |  97  |  17  ----------------------------<  135<H>  4.2   |  28  |  1.21    Ca    9.8      30 Jun 2022 07:44      PT/INR - ( 29 Jun 2022 10:44 )   PT: 11.9 sec;   INR: 1.00 ratio         PTT - ( 29 Jun 2022 10:44 )  PTT:31.4 sec          COVID-19 PCR: NotDetec (25 Jun 2022 22:28)  COVID-19 PCR: NotDetec (24 Jun 2022 17:54)

## 2022-06-30 NOTE — DIETITIAN INITIAL EVALUATION ADULT - OTHER INFO
Patient from home lives with wife admitted for vomiting, dizziness & abdominal pain. Patient from home lives with wife admitted for vomiting, dizziness & abdominal pain. Visited pt. alert but weak, reports for past 6 days with poor po intake while in hospital, consuming "very little of the meals served" & at time with "nausea & dizziness" when getting out of bed. Discussed menu selections & obtained food preferences, updated Kitchen/Diet Tech, tolerating Glucerna Shakes, 8oz with intake of ~50%. Pt. recalls -160 Lbs & loss >10 Lbs >1 week,  Patient from home lives with wife admitted for vomiting, dizziness & abdominal pain. Visited pt. alert but weak, reports for past 6 days with poor po intake while in hospital, consuming "very little of the meals served", intake <30% to 45% & at time with "nausea & dizziness" when getting out of bed. Also recalls -160 Lbs & voiced loss >10 Lbs >1 week, conducted nutrition focus physical exam & see below fields. Discussed menu selections & obtained food preferences, updated Kitchen/Diet Tech, tolerating Glucerna Shakes, 8oz with intake of ~50%, encourage po intake w/meal set up, Neuro/team following, pending lumbar puncture to evaluate CSF for Cytology?

## 2022-07-01 VITALS
DIASTOLIC BLOOD PRESSURE: 76 MMHG | RESPIRATION RATE: 18 BRPM | HEART RATE: 64 BPM | SYSTOLIC BLOOD PRESSURE: 120 MMHG | OXYGEN SATURATION: 96 % | TEMPERATURE: 98 F

## 2022-07-01 LAB
ALBUMIN CSF-MCNC: 142.9 MG/DL — HIGH (ref 14–25)
ALBUMIN SERPL ELPH-MCNC: 3187 MG/DL — LOW (ref 3500–5200)
ANION GAP SERPL CALC-SCNC: 8 MMOL/L — SIGNIFICANT CHANGE UP (ref 5–17)
BUN SERPL-MCNC: 21 MG/DL — HIGH (ref 7–18)
CALCIUM SERPL-MCNC: 9.2 MG/DL — SIGNIFICANT CHANGE UP (ref 8.4–10.5)
CHLORIDE SERPL-SCNC: 98 MMOL/L — SIGNIFICANT CHANGE UP (ref 96–108)
CO2 SERPL-SCNC: 27 MMOL/L — SIGNIFICANT CHANGE UP (ref 22–31)
CREAT SERPL-MCNC: 1.25 MG/DL — SIGNIFICANT CHANGE UP (ref 0.5–1.3)
CSF PCR RESULT: SIGNIFICANT CHANGE UP
EGFR: 55 ML/MIN/1.73M2 — LOW
GLUCOSE BLDC GLUCOMTR-MCNC: 146 MG/DL — HIGH (ref 70–99)
GLUCOSE BLDC GLUCOMTR-MCNC: 175 MG/DL — HIGH (ref 70–99)
GLUCOSE SERPL-MCNC: 130 MG/DL — HIGH (ref 70–99)
HCT VFR BLD CALC: 35.9 % — LOW (ref 39–50)
HGB BLD-MCNC: 12.1 G/DL — LOW (ref 13–17)
IGG CSF-MCNC: 33.7 MG/DL — HIGH
IGG FLD-MCNC: 1230 MG/DL — SIGNIFICANT CHANGE UP (ref 610–1660)
IGG SYNTH RATE SER+CSF CALC-MRATE: 44.8 MG/DAY — HIGH
IGG/ALB CLEAR SER+CSF-RTO: 0.6 — SIGNIFICANT CHANGE UP
IGG/ALB CSF: 0.24 RATIO — SIGNIFICANT CHANGE UP
IGG/ALB SER: 0.39 RATIO — SIGNIFICANT CHANGE UP
LABORATORY COMMENT REPORT: SIGNIFICANT CHANGE UP
MCHC RBC-ENTMCNC: 29.7 PG — SIGNIFICANT CHANGE UP (ref 27–34)
MCHC RBC-ENTMCNC: 33.7 GM/DL — SIGNIFICANT CHANGE UP (ref 32–36)
MCV RBC AUTO: 88 FL — SIGNIFICANT CHANGE UP (ref 80–100)
NRBC # BLD: 0 /100 WBCS — SIGNIFICANT CHANGE UP (ref 0–0)
PLATELET # BLD AUTO: 201 K/UL — SIGNIFICANT CHANGE UP (ref 150–400)
POTASSIUM SERPL-MCNC: 4.1 MMOL/L — SIGNIFICANT CHANGE UP (ref 3.5–5.3)
POTASSIUM SERPL-SCNC: 4.1 MMOL/L — SIGNIFICANT CHANGE UP (ref 3.5–5.3)
RBC # BLD: 4.08 M/UL — LOW (ref 4.2–5.8)
RBC # FLD: 14.7 % — HIGH (ref 10.3–14.5)
SODIUM SERPL-SCNC: 133 MMOL/L — LOW (ref 135–145)
SOURCE HSV 1/2: SIGNIFICANT CHANGE UP
WBC # BLD: 10.38 K/UL — SIGNIFICANT CHANGE UP (ref 3.8–10.5)
WBC # FLD AUTO: 10.38 K/UL — SIGNIFICANT CHANGE UP (ref 3.8–10.5)

## 2022-07-01 PROCEDURE — 87483 CNS DNA AMP PROBE TYPE 12-25: CPT

## 2022-07-01 PROCEDURE — 81001 URINALYSIS AUTO W/SCOPE: CPT

## 2022-07-01 PROCEDURE — 36415 COLL VENOUS BLD VENIPUNCTURE: CPT

## 2022-07-01 PROCEDURE — 84300 ASSAY OF URINE SODIUM: CPT

## 2022-07-01 PROCEDURE — 86617 LYME DISEASE ANTIBODY: CPT

## 2022-07-01 PROCEDURE — 87529 HSV DNA AMP PROBE: CPT

## 2022-07-01 PROCEDURE — 77002 NEEDLE LOCALIZATION BY XRAY: CPT

## 2022-07-01 PROCEDURE — 99285 EMERGENCY DEPT VISIT HI MDM: CPT | Mod: 25

## 2022-07-01 PROCEDURE — 83935 ASSAY OF URINE OSMOLALITY: CPT

## 2022-07-01 PROCEDURE — 84484 ASSAY OF TROPONIN QUANT: CPT

## 2022-07-01 PROCEDURE — 83605 ASSAY OF LACTIC ACID: CPT

## 2022-07-01 PROCEDURE — 85610 PROTHROMBIN TIME: CPT

## 2022-07-01 PROCEDURE — 86850 RBC ANTIBODY SCREEN: CPT

## 2022-07-01 PROCEDURE — 93005 ELECTROCARDIOGRAM TRACING: CPT

## 2022-07-01 PROCEDURE — 80053 COMPREHEN METABOLIC PANEL: CPT

## 2022-07-01 PROCEDURE — 85027 COMPLETE CBC AUTOMATED: CPT

## 2022-07-01 PROCEDURE — 87635 SARS-COV-2 COVID-19 AMP PRB: CPT

## 2022-07-01 PROCEDURE — 99239 HOSP IP/OBS DSCHRG MGMT >30: CPT

## 2022-07-01 PROCEDURE — 82962 GLUCOSE BLOOD TEST: CPT

## 2022-07-01 PROCEDURE — 89051 BODY FLUID CELL COUNT: CPT

## 2022-07-01 PROCEDURE — 86592 SYPHILIS TEST NON-TREP QUAL: CPT

## 2022-07-01 PROCEDURE — 84100 ASSAY OF PHOSPHORUS: CPT

## 2022-07-01 PROCEDURE — 86901 BLOOD TYPING SEROLOGIC RH(D): CPT

## 2022-07-01 PROCEDURE — 97162 PT EVAL MOD COMPLEX 30 MIN: CPT

## 2022-07-01 PROCEDURE — 96374 THER/PROPH/DIAG INJ IV PUSH: CPT

## 2022-07-01 PROCEDURE — 82945 GLUCOSE OTHER FLUID: CPT

## 2022-07-01 PROCEDURE — 83735 ASSAY OF MAGNESIUM: CPT

## 2022-07-01 PROCEDURE — 85652 RBC SED RATE AUTOMATED: CPT

## 2022-07-01 PROCEDURE — 71250 CT THORAX DX C-: CPT

## 2022-07-01 PROCEDURE — 86403 PARTICLE AGGLUT ANTBDY SCRN: CPT

## 2022-07-01 PROCEDURE — 84157 ASSAY OF PROTEIN OTHER: CPT

## 2022-07-01 PROCEDURE — 86900 BLOOD TYPING SEROLOGIC ABO: CPT

## 2022-07-01 PROCEDURE — 87086 URINE CULTURE/COLONY COUNT: CPT

## 2022-07-01 PROCEDURE — 83615 LACTATE (LD) (LDH) ENZYME: CPT

## 2022-07-01 PROCEDURE — 85025 COMPLETE CBC W/AUTO DIFF WBC: CPT

## 2022-07-01 PROCEDURE — 80048 BASIC METABOLIC PNL TOTAL CA: CPT

## 2022-07-01 PROCEDURE — 74177 CT ABD & PELVIS W/CONTRAST: CPT | Mod: MA

## 2022-07-01 PROCEDURE — 87116 MYCOBACTERIA CULTURE: CPT

## 2022-07-01 PROCEDURE — 85730 THROMBOPLASTIN TIME PARTIAL: CPT

## 2022-07-01 PROCEDURE — 82550 ASSAY OF CK (CPK): CPT

## 2022-07-01 PROCEDURE — 83690 ASSAY OF LIPASE: CPT

## 2022-07-01 PROCEDURE — 96375 TX/PRO/DX INJ NEW DRUG ADDON: CPT

## 2022-07-01 RX ADMIN — Medication 2: at 11:53

## 2022-07-01 RX ADMIN — CARVEDILOL PHOSPHATE 12.5 MILLIGRAM(S): 80 CAPSULE, EXTENDED RELEASE ORAL at 05:57

## 2022-07-01 NOTE — CHART NOTE - NSCHARTNOTEFT_GEN_A_CORE
Pt. is s/p LP 6/30, tolerated procedure well.  Study results pending.  Pt. informed that results of his LP will be forwarded to his oncologist by Dr. Birmingham and that he should follow up with his PCP and oncology.  Pt. in agreement with plan, discharge completed, transportation arranged by CM.

## 2022-07-04 LAB — CRYPTOC AG CSF-ACNC: NEGATIVE — SIGNIFICANT CHANGE UP

## 2022-07-05 LAB — VDRL CSF-TITR: SIGNIFICANT CHANGE UP

## 2022-07-12 LAB — B BURGDOR AB CSF-ACNC: SIGNIFICANT CHANGE UP

## 2022-08-12 RX ORDER — LOSARTAN POTASSIUM 100 MG/1
1 TABLET, FILM COATED ORAL
Qty: 0 | Refills: 0 | DISCHARGE

## 2022-08-12 RX ORDER — MECLIZINE HCL 12.5 MG
1 TABLET ORAL
Qty: 0 | Refills: 0 | DISCHARGE

## 2022-08-12 RX ORDER — SITAGLIPTIN 50 MG/1
1 TABLET, FILM COATED ORAL
Qty: 0 | Refills: 0 | DISCHARGE

## 2022-08-12 RX ORDER — ASPIRIN/CALCIUM CARB/MAGNESIUM 324 MG
1 TABLET ORAL
Qty: 0 | Refills: 0 | DISCHARGE

## 2022-08-12 RX ORDER — ATORVASTATIN CALCIUM 80 MG/1
1 TABLET, FILM COATED ORAL
Qty: 0 | Refills: 0 | DISCHARGE

## 2022-08-12 RX ORDER — CARVEDILOL PHOSPHATE 80 MG/1
1 CAPSULE, EXTENDED RELEASE ORAL
Qty: 0 | Refills: 0 | DISCHARGE

## 2022-08-20 LAB
CULTURE RESULTS: SIGNIFICANT CHANGE UP
SPECIMEN SOURCE: SIGNIFICANT CHANGE UP

## 2022-11-11 NOTE — ED ADULT NURSE NOTE - BREATHING, MLM
[facility]ology Oncology Consult    Consults    Reason for Consultation: Lymphoma     HPI     Angel is a 67 year old male presenting with fatigue, poor PO intake, nausea and was noted to have sodium of 112. Nephrology and endocrinology have been consulted and patient's sodium is improving and around 124 now. He was also noted to have both TSH and T4 low and borderling low normal cortisol and low normal ACTH.   He had MRI pituitary done, which showed well-defined T2 hypointense thickening and avid enhancement of the hypothalamus with questionable adjacent edema in the thalamus b/l. Possible thickening and hyperenhancement of the pituitary infundibulum--differential considerations of lymphoma, langerhans cell histiocytosis, hypophysitis, neurosarcoidosis etc.      He is feeling a bit better, but still tired and blurry vision.   No new complaints      Past Medical History     Past Medical History:   Diagnosis Date   • High cholesterol    • Lymphoma (CMS/HCC)           Surgical History     Past Surgical History:   Procedure Laterality Date   • Hernia repair            Social History     Social History     Tobacco Use   • Smoking status: Never Smoker   • Smokeless tobacco: Never Used   Vaping Use   • Vaping Use: never used   Substance Use Topics   • Alcohol use: Yes     Comment: occasional   • Drug use: Never         Family History     History reviewed. No pertinent family history.       Allergies     ALLERGIES:  Shellfish-derived products   (food or med), Amoxicillin, Amoxil, Shellfish allergy   (food or med), and Sulfa antibiotics      Medications     Medications Prior to Admission   Medication Sig Dispense Refill   • ondansetron (ZOFRAN) 4 MG tablet Take 4 mg by mouth every 8 hours as needed for Nausea.     • L-THEANINE PO Take 1 tablet by mouth at bedtime.     • Acetylcarnitine HCl (Acetyl L-Carnitine) 500 MG Cap Take 1 capsule by mouth.     • Alpha-Lipoic Acid 100 MG Tab Take 1 tablet by mouth.     • Cholecalciferol  (Vitamin D3) 50 mcg (2,000 units) capsule Take 50 mcg by mouth.     • Coenzyme Q10 (Co Q 10) 100 MG Cap Take 100 mg by mouth.     • GRAPE SEED EXTRACT PO Take 1 tablet by mouth.     • Lutein-Zeaxanthin 25-5 MG Cap      • Multiple Vitamins-Minerals (vitamin - therapeutic multivitamins w/minerals) tablet Take 1 tablet by mouth.     • Omega-3 Fatty Acids (omega-3 fish oil) 1000 MG capsule Take 1,000 mg by mouth.     • PLANT STEROLS AND STANOLS PO      • POTASSIUM CITRATE PO      • Probiotic Product (Probiotic-10 Ultimate) Cap      • Vinpocetine Powder      • Ascorbic Acid (vitamin C) 1000 MG tablet Take 1,000 mg by mouth.     • vitamin E 400 UNIT capsule Take 400 Units by mouth.     • VITAMIN K PO      • zinc methionate 50 MG capsule      • atorvastatin (LIPITOR) 20 MG tablet Take 20 mg by mouth at bedtime.     • aspirin 81 MG EC tablet Take 81 mg by mouth every morning.     • MILK THISTLE EXTRACT PO Take 70 mg by mouth.     • Phosphatidylserine 100 MG Cap Take 100 mg by mouth.     • RESVERATROL PO Take 75 mg by mouth.         Current Facility-Administered Medications   Medication Dose Route Frequency Provider Last Rate Last Admin   • sodium chloride tablet 2,000 mg  2 g Oral TID  Vance Ovalles MD   2,000 mg at 11/11/22 0824   • levothyroxine (SYNTHROID, LEVOTHROID) tablet 50 mcg  50 mcg Oral QAM AC Ramu Grande MD   50 mcg at 11/11/22 0549   • hydroCORTisone (Solu-CORTEF) PF injection 50 mg  50 mg Intravenous 2 times per day Ramu Grande MD   50 mg at 11/11/22 0824   • calcium carbonate (TUMS) chewable tablet 500 mg  500 mg Oral Q4H PRN Lanette Weller MD       • atorvastatin (LIPITOR) tablet 20 mg  20 mg Oral QHS Lanette Weller MD   20 mg at 11/10/22 2021   • sodium chloride 0.9 % flush bag 25 mL  25 mL Intravenous PRN Lanette Weller MD       • sodium chloride (PF) 0.9 % injection 2 mL  2 mL Intracatheter 2 times per day Lanette Weller MD   2 mL at 11/11/22 0826   • [Held by provider] enoxaparin (LOVENOX) injection 40 mg   40 mg Subcutaneous Daily Lanette Weller MD   40 mg at 11/10/22 1351         Review of Systems      ROS discussed. Pertinent positives and negatives are as in HPI      Last Recorded Vitals     Temp:  [96.6 °F (35.9 °C)-97.7 °F (36.5 °C)] 96.6 °F (35.9 °C)  Heart Rate:  [69-97] 73  Resp:  [12-21] 15  BP: (116-145)/(53-85) 134/82      Physical Exam   GENERAL: Alert, no acute distress  HEENT: Normocephalic, atraumatic, no scleral icterus, normal nose, oral mucosa moist  NECK: Supple, no cervical or supraclavicular lymphadenopathy palpable  HEART: Regular rate; normal S1, S2  LUNGS: Clear to auscultation bilaterally; no wheezing  ABDOMEN: Soft, non-tender, non-distended  EXTREMITY: No Lower extremity edema  LYMPH: no axillary lymphadenopathy palpable  NEURO: Alert, no gross neurological deficits  SKIN: no rashes, bruising, or petechiae       Relevant Results        Component      Latest Ref Rng & Units 11/11/2022   WBC      4.2 - 11.0 K/mcL 5.2   RBC      4.50 - 5.90 mil/mcL 4.46 (L)   HGB      13.0 - 17.0 g/dL 13.6   HCT      39.0 - 51.0 % 36.9 (L)   MCV      78.0 - 100.0 fl 82.7   MCH      26.0 - 34.0 pg 30.5   MCHC      32.0 - 36.5 g/dL 36.9 (H)   RDW-CV      11.0 - 15.0 % 13.0   RDW-SD      39.0 - 50.0 fL 39.5   PLT      140 - 450 K/mcL 181   NRBC      <=0 /100 WBC 0   Neutrophil      % 65   LYMPH      % 18   MONO      % 16   EOSIN      % 1   BASO      % 0   Immature Granulocytes      % 0   Absolute Neutrophil      1.8 - 7.7 K/mcL 3.3   Absolute Lymph      1.0 - 4.0 K/mcL 0.9 (L)   Absolute Mono      0.3 - 0.9 K/mcL 0.8   Absolute Eos      0.0 - 0.5 K/mcL 0.1   Absolute Baso      0.0 - 0.3 K/mcL 0.0   Absolute Immature Granulocytes      0.0 - 0.2 K/mcL 0.0       Imaging        MRI PITUITARY W WO CONTRAST     HISTORY:  Hypothyroidism, hyponatremia.  Hypoadrenalism.  History of  lymphoma.     COMPARISON: None.     TECHNIQUE: Routine pre and postcontrast MRI of the pituitary was performed.   Gadavist was infused  intravenously.        RESULT:     The extracranial soft tissues are unremarkable.  There is no pathologic  marrow placement.  Craniocervical junction is unremarkable.     There is unremarkable precontrast and postcontrast signal of the  adenohypophysis/anterior pituitary gland without enlargement or focal  lesions.  No abnormalities of the posterior pituitary gland.  The  hypothalamus however is abnormal demonstrating T2 hypointensity with avid  enhancement involving the median eminence regions bilaterally as well as  the tuber cinereum bilaterally.  This portion of the hypothalamus is  thickened as well.  The signal abnormality and enhancement is well-defined,  symmetric, and bilateral.  There may be thickening as well as  hyperenhancement of the pituitary infundibulum although the pituitary  infundibulum could be normal.  Midline position of the pituitary  infundibulum.  The optic chiasm is unremarkable.  The suprasellar cistern  is unremarkable.  The Meckel's caves are unremarkable.  The cavernous  sinuses are unremarkable.  There is mild chronic microvascular ischemic  change.  Moderate generalized parenchymal brain volume loss.  There is no  other abnormal intracranial enhancement.  No abnormal leptomeningeal  enhancement.     Questionable adjacent T2 hyperintensity within the bilateral thalamus.     IMPRESSION:     1.  Well-defined, symmetric T2 hypointense thickening and avid enhancement  of the hypothalamus with questionable adjacent edema in the thalamus  bilaterally.  Possible thickening and hyperenhancement of the pituitary  infundibulum.     2.  Differential considerations for the above include lymphoma (given the  clinical history), langerhans cell histiocytosis, hypophysitis,  neurosarcoidosis Whipple disease, or other tumor.     3.  Unremarkable anterior and posterior pituitary glands, Meckel's caves,  cavernous sinuses, suprasellar cistern, and optic chiasm.     4.  Mild chronic microvascular  ischemic change and moderate brain volume  loss.              Electronically Signed by: LAVELL BELLA M.D.   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 DLBCL    2. Abnormal pituitary imaging    3. Hyponatremia    PLAN:  -MRI results discussed with patient, including listed differential  -discussed we will need to get CT scan c/a/p to re-assess for systemic relapse  -check LDH  -due to concern of possible lymphoma involving the pituitary gland, will need LP to get flow cytometry and cytology  -also consider neurosurgery evaluation due to abnormal findings on the MRI involving pituitary and possible adjacent edema   -further management pending above workup  -discussed with hospitalist  -also updated Dr. Easton, patient's primary oncologist   -discussed with patient who is agreeable with plan       Deb Davis DO  11/11/2022       Spontaneous, unlabored and symmetrical

## 2023-01-15 NOTE — ED ADULT NURSE NOTE - BREATHING, MLM
Reason for Call:  Other appointment and call back    Detailed comments: Patients daughter is requesting a hospital follow up appointment for patient within 7 days. Patient was discharged from [facility] on 1/14/2023.     Phone Number Patient can be reached at: Cell number on file:    Telephone Information:   Mobile [phone]       Best Time: Any    Can we leave a detailed message on this number? YES    Call taken on 1/15/2023 at 10:28 AM by Taty Ferrell       Spontaneous, unlabored and symmetrical

## 2024-01-23 NOTE — ED PROVIDER NOTE - NSDCPRINTRESULTS_ED_ALL_ED
Daughter
Patient requests all Lab, Cardiology, and Radiology Results on their Discharge Instructions

## 2025-01-08 NOTE — H&P ADULT. - NO PERTINENT FAMILY HISTORY
Patient with history of Jaycee-en-Y in 2016 with revision in 2020 secondary to perforated GJ anastomosis with complaint of abdominal pain, nausea, vomiting, diarrhea that began Saturday.  Her son had the same symptoms and resolved after a day.  CT with abnormal dilated fluid loops of proximal small bowel. Several loops of nondilated fluid-filled small bowel are also noted throughout the abdomen and pelvis as well as   liquid stool within the colon. The patient is status post gastric bypass surgery with only a small amount of the administered oral contrast located within the stomach remnant and proximal small bowel.     Bariatrics was consulted and Gastrografin challenge was performed with contrast reaching the colon.  On 1/6 she was taken to the OR for ex lap with intraoperative EGD.  Petersons and JJ defect was obliterated no internal hernia.  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